# Patient Record
Sex: MALE | Race: WHITE | NOT HISPANIC OR LATINO | Employment: FULL TIME | URBAN - METROPOLITAN AREA
[De-identification: names, ages, dates, MRNs, and addresses within clinical notes are randomized per-mention and may not be internally consistent; named-entity substitution may affect disease eponyms.]

---

## 2019-12-09 ENCOUNTER — CLINICAL SUPPORT (OUTPATIENT)
Dept: INTERNAL MEDICINE | Facility: CLINIC | Age: 39
End: 2019-12-09

## 2019-12-09 ENCOUNTER — OFFICE VISIT (OUTPATIENT)
Dept: INTERNAL MEDICINE | Facility: CLINIC | Age: 39
End: 2019-12-09

## 2019-12-09 VITALS
SYSTOLIC BLOOD PRESSURE: 138 MMHG | DIASTOLIC BLOOD PRESSURE: 88 MMHG | OXYGEN SATURATION: 96 % | HEART RATE: 72 BPM | WEIGHT: 180.56 LBS

## 2019-12-09 DIAGNOSIS — R79.89 ELEVATED LFTS: ICD-10-CM

## 2019-12-09 DIAGNOSIS — Z00.00 ROUTINE PHYSICAL EXAMINATION: Primary | ICD-10-CM

## 2019-12-09 DIAGNOSIS — E78.49 OTHER HYPERLIPIDEMIA: ICD-10-CM

## 2019-12-09 DIAGNOSIS — Z00.00 ROUTINE GENERAL MEDICAL EXAMINATION AT A HEALTH CARE FACILITY: Primary | ICD-10-CM

## 2019-12-09 DIAGNOSIS — I10 HYPERTENSION, UNSPECIFIED TYPE: ICD-10-CM

## 2019-12-09 LAB
ALBUMIN SERPL BCP-MCNC: 3.9 G/DL (ref 3.5–5.2)
ALP SERPL-CCNC: 95 U/L (ref 55–135)
ALT SERPL W/O P-5'-P-CCNC: 123 U/L (ref 10–44)
ANION GAP SERPL CALC-SCNC: 12 MMOL/L (ref 8–16)
AST SERPL-CCNC: 182 U/L (ref 10–40)
BILIRUB SERPL-MCNC: 1.8 MG/DL (ref 0.1–1)
BUN SERPL-MCNC: 8 MG/DL (ref 6–20)
CALCIUM SERPL-MCNC: 9.8 MG/DL (ref 8.7–10.5)
CHLORIDE SERPL-SCNC: 103 MMOL/L (ref 95–110)
CHOLEST SERPL-MCNC: 239 MG/DL (ref 120–199)
CHOLEST/HDLC SERPL: 6 {RATIO} (ref 2–5)
CO2 SERPL-SCNC: 28 MMOL/L (ref 23–29)
CREAT SERPL-MCNC: 0.8 MG/DL (ref 0.5–1.4)
ERYTHROCYTE [DISTWIDTH] IN BLOOD BY AUTOMATED COUNT: 12.1 % (ref 11.5–14.5)
EST. GFR  (AFRICAN AMERICAN): >60 ML/MIN/1.73 M^2
EST. GFR  (NON AFRICAN AMERICAN): >60 ML/MIN/1.73 M^2
ESTIMATED AVG GLUCOSE: 97 MG/DL (ref 68–131)
GLUCOSE SERPL-MCNC: 104 MG/DL (ref 70–110)
HBA1C MFR BLD HPLC: 5 % (ref 4–5.6)
HCT VFR BLD AUTO: 44.4 % (ref 40–54)
HDLC SERPL-MCNC: 40 MG/DL (ref 40–75)
HDLC SERPL: 16.7 % (ref 20–50)
HGB BLD-MCNC: 14.7 G/DL (ref 14–18)
LDLC SERPL CALC-MCNC: 169.8 MG/DL (ref 63–159)
MCH RBC QN AUTO: 36 PG (ref 27–31)
MCHC RBC AUTO-ENTMCNC: 33.1 G/DL (ref 32–36)
MCV RBC AUTO: 109 FL (ref 82–98)
NONHDLC SERPL-MCNC: 199 MG/DL
PLATELET # BLD AUTO: 147 K/UL (ref 150–350)
PMV BLD AUTO: 9.6 FL (ref 9.2–12.9)
POTASSIUM SERPL-SCNC: 3.9 MMOL/L (ref 3.5–5.1)
PROT SERPL-MCNC: 8.7 G/DL (ref 6–8.4)
RBC # BLD AUTO: 4.08 M/UL (ref 4.6–6.2)
SODIUM SERPL-SCNC: 143 MMOL/L (ref 136–145)
TRIGL SERPL-MCNC: 146 MG/DL (ref 30–150)
TSH SERPL DL<=0.005 MIU/L-ACNC: 0.8 UIU/ML (ref 0.4–4)
WBC # BLD AUTO: 6.42 K/UL (ref 3.9–12.7)

## 2019-12-09 PROCEDURE — 36415 COLL VENOUS BLD VENIPUNCTURE: CPT

## 2019-12-09 PROCEDURE — 99999 PR PBB SHADOW E&M-NEW PATIENT-LVL III: ICD-10-PCS | Mod: PBBFAC,,, | Performed by: INTERNAL MEDICINE

## 2019-12-09 PROCEDURE — 99386 PREV VISIT NEW AGE 40-64: CPT | Mod: S$PBB,,, | Performed by: INTERNAL MEDICINE

## 2019-12-09 PROCEDURE — 85027 COMPLETE CBC AUTOMATED: CPT

## 2019-12-09 PROCEDURE — 99999 PR PBB SHADOW E&M-NEW PATIENT-LVL III: CPT | Mod: PBBFAC,,, | Performed by: INTERNAL MEDICINE

## 2019-12-09 PROCEDURE — 80053 COMPREHEN METABOLIC PANEL: CPT

## 2019-12-09 PROCEDURE — 99386 PR PREVENTIVE VISIT,NEW,40-64: ICD-10-PCS | Mod: S$PBB,,, | Performed by: INTERNAL MEDICINE

## 2019-12-09 PROCEDURE — 84443 ASSAY THYROID STIM HORMONE: CPT

## 2019-12-09 PROCEDURE — 80061 LIPID PANEL: CPT

## 2019-12-09 PROCEDURE — 99203 OFFICE O/P NEW LOW 30 MIN: CPT | Mod: PBBFAC | Performed by: INTERNAL MEDICINE

## 2019-12-09 PROCEDURE — 83036 HEMOGLOBIN GLYCOSYLATED A1C: CPT

## 2019-12-09 RX ORDER — NEBIVOLOL 10 MG/1
10 TABLET ORAL DAILY
COMMUNITY
End: 2019-12-09 | Stop reason: SDUPTHER

## 2019-12-09 RX ORDER — NEBIVOLOL 10 MG/1
10 TABLET ORAL DAILY
Qty: 30 TABLET | Refills: 12 | Status: SHIPPED | OUTPATIENT
Start: 2019-12-09 | End: 2020-03-20 | Stop reason: SDUPTHER

## 2019-12-09 NOTE — LETTER
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  This letter will serve a brief summary of the history, physical findings, and laboratory/studies performed and recommendations at that time.    Reason for Visit: Executive Health Preventive Physical Examination    Subjective:       Patient ID: Сергей Ragsdale is a 39 y.o. male.    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  Healthy young male coming from [place].  He says he has generally been healthy but does have a history of hypertension and elevated LFTs.  He says this has been watched in the past and he has had ultrasound and CT scan.  Blood pressure has been fairly well controlled on Bystolic.  He would like a new prescription.  He does have a skin mole on the torso that was seen by dermatologist smoked 2 years ago and he would like to continue to follow.  Once he is on the staff and insurance has kicked in he will consider establishing with some specialists    Review of Systems   Constitutional: Negative for chills, fatigue, fever and unexpected weight change.   HENT: Negative for nosebleeds and trouble swallowing.    Eyes: Negative for pain and visual disturbance.   Respiratory: Negative for cough, shortness of breath and wheezing.    Cardiovascular: Negative for chest pain and palpitations.   Gastrointestinal: Negative for abdominal pain, constipation, diarrhea, nausea and vomiting.   Genitourinary: Negative for difficulty urinating and hematuria.   Musculoskeletal: Negative for neck pain.   Skin: Negative for rash.        Stable skin mole just above umbilicus   Neurological: Negative for  dizziness and headaches.   Hematological: Does not bruise/bleed easily.   Psychiatric/Behavioral: Negative for dysphoric mood, sleep disturbance and suicidal ideas.       Objective:      Physical Exam   Constitutional: He is oriented to person, place, and time. He appears well-developed and well-nourished. No distress.   HENT:   Head: Normocephalic and atraumatic.   Right Ear: External ear normal.   Left Ear: External ear normal.   Mouth/Throat: Oropharynx is clear and moist. No oropharyngeal exudate.   TM's clear, pharynx clear   Eyes: Pupils are equal, round, and reactive to light. Conjunctivae and EOM are normal. No scleral icterus.   Neck: Normal range of motion. Neck supple. No thyromegaly present.   No supraclavicular nodes palpated   Cardiovascular: Normal rate, regular rhythm and normal heart sounds.   No murmur heard.  Pulmonary/Chest: Effort normal and breath sounds normal. He has no wheezes.   Abdominal: Soft. Bowel sounds are normal. He exhibits no mass. There is no tenderness.   No organomegaly   Musculoskeletal: He exhibits no edema.   Lymphadenopathy:     He has no cervical adenopathy.   Neurological: He is alert and oriented to person, place, and time.   Skin: No rash noted. No erythema. No pallor.   Brown homogeneous round skin mole above the umbilicus about the size of a pencil eraser.  Patient thinks little change in the last several years.   Psychiatric: He has a normal mood and affect. His behavior is normal.       Assessment:       1. Routine physical examination    2. Elevated LFTs    3. Hypertension, unspecified type    4. Other hyperlipidemia        Plan:       Сергей was seen today for [facility].    Diagnoses and all orders for this visit:    Routine physical examination    Elevated LFTs    Hypertension, unspecified type    Other hyperlipidemia    Other orders  -     nebivolol (BYSTOLIC) 10 MG Tab; Take 1 tablet (10 mg total) by mouth once daily.        Personal and family History as  well as problem list reviewed.   Continue current medication.  Follow-up in a few months if patient would like to reassess liver, blood pressure skin.     Labs:  Results for orders placed or performed in visit on 12/09/19   Comprehensive metabolic panel   Result Value Ref Range    Sodium 143 136 - 145 mmol/L    Potassium 3.9 3.5 - 5.1 mmol/L    Chloride 103 95 - 110 mmol/L    CO2 28 23 - 29 mmol/L    Glucose 104 70 - 110 mg/dL    BUN, Bld 8 6 - 20 mg/dL    Creatinine 0.8 0.5 - 1.4 mg/dL    Calcium 9.8 8.7 - 10.5 mg/dL    Total Protein 8.7 (H) 6.0 - 8.4 g/dL    Albumin 3.9 3.5 - 5.2 g/dL    Total Bilirubin 1.8 (H) 0.1 - 1.0 mg/dL    Alkaline Phosphatase 95 55 - 135 U/L     (H) 10 - 40 U/L     (H) 10 - 44 U/L    Anion Gap 12 8 - 16 mmol/L    eGFR if African American >60.0 >60 mL/min/1.73 m^2    eGFR if non African American >60.0 >60 mL/min/1.73 m^2   CBC Without Differential   Result Value Ref Range    WBC 6.42 3.90 - 12.70 K/uL    RBC 4.08 (L) 4.60 - 6.20 M/uL    Hemoglobin 14.7 14.0 - 18.0 g/dL    Hematocrit 44.4 40.0 - 54.0 %    Mean Corpuscular Volume 109 (H) 82 - 98 fL    Mean Corpuscular Hemoglobin 36.0 (H) 27.0 - 31.0 pg    Mean Corpuscular Hemoglobin Conc 33.1 32.0 - 36.0 g/dL    RDW 12.1 11.5 - 14.5 %    Platelets 147 (L) 150 - 350 K/uL    MPV 9.6 9.2 - 12.9 fL   Lipid panel   Result Value Ref Range    Cholesterol 239 (H) 120 - 199 mg/dL    Triglycerides 146 30 - 150 mg/dL    HDL 40 40 - 75 mg/dL    LDL Cholesterol 169.8 (H) 63.0 - 159.0 mg/dL    Hdl/Cholesterol Ratio 16.7 (L) 20.0 - 50.0 %    Total Cholesterol/HDL Ratio 6.0 (H) 2.0 - 5.0    Non-HDL Cholesterol 199 mg/dL   TSH   Result Value Ref Range    TSH 0.795 0.400 - 4.000 uIU/mL   Hemoglobin A1c   Result Value Ref Range    Hemoglobin A1C 5.0 4.0 - 5.6 %    Estimated Avg Glucose 97 68 - 131 mg/dL        Assessment/Recommendations:  Routine Health Maintenance    At this time, you appear to be in good medical condition.  I look forward to  seeing you again next year.  Please contact me should you have any questions or concerns regarding physical findings, or my recommendations.              If you have any questions or concerns, please don't hesitate to call.    Sincerely,        Roney Love MD

## 2019-12-09 NOTE — PROGRESS NOTES
Subjective:       Patient ID: Сергей Ragsdale is a 39 y.o. male.    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  Healthy young male coming from [place].  He says he has generally been healthy but does have a history of hypertension and elevated LFTs.  He says this has been watched in the past and he has had ultrasound and CT scan.  Blood pressure has been fairly well controlled on Bystolic.  He would like a new prescription.  He does have a skin mole on the torso that was seen by dermatologist smoked 2 years ago and he would like to continue to follow.  Once he is on the staff and insurance has kicked in he will consider establishing with some specialists    Review of Systems   Constitutional: Negative for chills, fatigue, fever and unexpected weight change.   HENT: Negative for nosebleeds and trouble swallowing.    Eyes: Negative for pain and visual disturbance.   Respiratory: Negative for cough, shortness of breath and wheezing.    Cardiovascular: Negative for chest pain and palpitations.   Gastrointestinal: Negative for abdominal pain, constipation, diarrhea, nausea and vomiting.   Genitourinary: Negative for difficulty urinating and hematuria.   Musculoskeletal: Negative for neck pain.   Skin: Negative for rash.        Stable skin mole just above umbilicus   Neurological: Negative for dizziness and headaches.   Hematological: Does not bruise/bleed easily.   Psychiatric/Behavioral: Negative for dysphoric mood, sleep disturbance and suicidal ideas.       Objective:      Physical Exam   Constitutional: He is oriented to person, place, and time. He appears well-developed and well-nourished. No distress.   HENT:   Head: Normocephalic and atraumatic.   Right Ear: External ear normal.   Left Ear: External ear normal.   Mouth/Throat: Oropharynx is clear and moist. No oropharyngeal exudate.   TM's clear, pharynx clear   Eyes:  Pupils are equal, round, and reactive to light. Conjunctivae and EOM are normal. No scleral icterus.   Neck: Normal range of motion. Neck supple. No thyromegaly present.   No supraclavicular nodes palpated   Cardiovascular: Normal rate, regular rhythm and normal heart sounds.   No murmur heard.  Pulmonary/Chest: Effort normal and breath sounds normal. He has no wheezes.   Abdominal: Soft. Bowel sounds are normal. He exhibits no mass. There is no tenderness.   No organomegaly   Musculoskeletal: He exhibits no edema.   Lymphadenopathy:     He has no cervical adenopathy.   Neurological: He is alert and oriented to person, place, and time.   Skin: No rash noted. No erythema. No pallor.   Brown homogeneous round skin mole above the umbilicus about the size of a pencil eraser.  Patient thinks little change in the last several years.   Psychiatric: He has a normal mood and affect. His behavior is normal.       Assessment:       1. Routine physical examination    2. Elevated LFTs    3. Hypertension, unspecified type    4. Other hyperlipidemia        Plan:       Сергей was seen today for [facility].    Diagnoses and all orders for this visit:    Routine physical examination    Elevated LFTs    Hypertension, unspecified type    Other hyperlipidemia    Other orders  -     nebivolol (BYSTOLIC) 10 MG Tab; Take 1 tablet (10 mg total) by mouth once daily.        Personal and family History as well as problem list reviewed.   Continue current medication.  Follow-up in a few months if patient would like to reassess liver, blood pressure skin.

## 2020-03-20 ENCOUNTER — PATIENT MESSAGE (OUTPATIENT)
Dept: INTERNAL MEDICINE | Facility: CLINIC | Age: 40
End: 2020-03-20

## 2020-03-20 RX ORDER — NEBIVOLOL 10 MG/1
10 TABLET ORAL DAILY
Qty: 90 TABLET | Refills: 12 | Status: SHIPPED | OUTPATIENT
Start: 2020-03-20

## 2020-03-20 RX ORDER — NEBIVOLOL 10 MG/1
10 TABLET ORAL DAILY
Qty: 30 TABLET | Refills: 12 | Status: SHIPPED | OUTPATIENT
Start: 2020-03-20 | End: 2020-03-20 | Stop reason: SDUPTHER

## 2020-04-21 DIAGNOSIS — Z01.84 ANTIBODY RESPONSE EXAMINATION: ICD-10-CM

## 2020-05-21 DIAGNOSIS — Z01.84 ANTIBODY RESPONSE EXAMINATION: ICD-10-CM

## 2020-06-20 DIAGNOSIS — Z01.84 ANTIBODY RESPONSE EXAMINATION: ICD-10-CM

## 2020-07-20 DIAGNOSIS — Z01.84 ANTIBODY RESPONSE EXAMINATION: ICD-10-CM

## 2020-07-31 ENCOUNTER — PATIENT OUTREACH (OUTPATIENT)
Dept: ADMINISTRATIVE | Facility: OTHER | Age: 40
End: 2020-07-31

## 2020-07-31 ENCOUNTER — OFFICE VISIT (OUTPATIENT)
Dept: PODIATRY | Facility: CLINIC | Age: 40
End: 2020-07-31
Payer: COMMERCIAL

## 2020-07-31 ENCOUNTER — TELEPHONE (OUTPATIENT)
Dept: PODIATRY | Facility: CLINIC | Age: 40
End: 2020-07-31

## 2020-07-31 VITALS — SYSTOLIC BLOOD PRESSURE: 132 MMHG | WEIGHT: 191.81 LBS | DIASTOLIC BLOOD PRESSURE: 84 MMHG | HEART RATE: 72 BPM

## 2020-07-31 DIAGNOSIS — M19.079 ARTHRITIS OF FOOT: ICD-10-CM

## 2020-07-31 DIAGNOSIS — M79.671 FOOT PAIN, BILATERAL: Primary | ICD-10-CM

## 2020-07-31 DIAGNOSIS — M79.672 FOOT PAIN, BILATERAL: Primary | ICD-10-CM

## 2020-07-31 DIAGNOSIS — M24.573 EQUINUS CONTRACTURE OF ANKLE: ICD-10-CM

## 2020-07-31 PROCEDURE — 3079F PR MOST RECENT DIASTOLIC BLOOD PRESSURE 80-89 MM HG: ICD-10-PCS | Mod: CPTII,S$GLB,, | Performed by: PODIATRIST

## 2020-07-31 PROCEDURE — 99203 OFFICE O/P NEW LOW 30 MIN: CPT | Mod: 25,S$GLB,, | Performed by: PODIATRIST

## 2020-07-31 PROCEDURE — 3079F DIAST BP 80-89 MM HG: CPT | Mod: CPTII,S$GLB,, | Performed by: PODIATRIST

## 2020-07-31 PROCEDURE — 3075F PR MOST RECENT SYSTOLIC BLOOD PRESS GE 130-139MM HG: ICD-10-PCS | Mod: CPTII,S$GLB,, | Performed by: PODIATRIST

## 2020-07-31 PROCEDURE — 3075F SYST BP GE 130 - 139MM HG: CPT | Mod: CPTII,S$GLB,, | Performed by: PODIATRIST

## 2020-07-31 PROCEDURE — 99203 PR OFFICE/OUTPT VISIT, NEW, LEVL III, 30-44 MIN: ICD-10-PCS | Mod: 25,S$GLB,, | Performed by: PODIATRIST

## 2020-07-31 PROCEDURE — 29540 PR STRAPPING; ANKLE &/OR FOOT: ICD-10-PCS | Mod: 50,S$GLB,, | Performed by: PODIATRIST

## 2020-07-31 PROCEDURE — 99999 PR PBB SHADOW E&M-EST. PATIENT-LVL III: CPT | Mod: PBBFAC,,, | Performed by: PODIATRIST

## 2020-07-31 PROCEDURE — 99999 PR PBB SHADOW E&M-EST. PATIENT-LVL III: ICD-10-PCS | Mod: PBBFAC,,, | Performed by: PODIATRIST

## 2020-07-31 PROCEDURE — 29540 STRAPPING ANKLE &/FOOT: CPT | Mod: 50,S$GLB,, | Performed by: PODIATRIST

## 2020-07-31 RX ORDER — MELOXICAM 15 MG/1
15 TABLET ORAL DAILY
Qty: 30 TABLET | Refills: 0 | Status: ON HOLD | OUTPATIENT
Start: 2020-07-31 | End: 2020-09-16

## 2020-07-31 NOTE — PROGRESS NOTES
Subjective:      Patient ID: Сергей Ragsdale is a 40 y.o. male.    Chief Complaint: Foot Pain (8/10 bilateral foot pain )    Aching throbbing intense pain both feet/arches deep inside the foot.  Gradual onset, worsening over past several months, aggravated by increased weight bearing, shoe gear, pressure.  Otc inserts, shoe changes helped minimally so far..  OTC pain med not helping.  Denies trauma, surgery.    Review of Systems   Constitution: Negative for chills, diaphoresis, fever, malaise/fatigue and night sweats.   Cardiovascular: Negative for claudication, cyanosis, leg swelling and syncope.   Skin: Negative for color change, dry skin, nail changes, rash, suspicious lesions and unusual hair distribution.   Musculoskeletal: Positive for joint pain. Negative for falls, joint swelling, muscle cramps, muscle weakness and stiffness.   Gastrointestinal: Negative for constipation, diarrhea, nausea and vomiting.   Neurological: Negative for brief paralysis, disturbances in coordination, focal weakness, numbness, paresthesias, sensory change and tremors.           Objective:      Physical Exam  Constitutional:       General: He is not in acute distress.     Appearance: He is well-developed. He is not diaphoretic.   Cardiovascular:      Pulses:           Popliteal pulses are 2+ on the right side and 2+ on the left side.        Dorsalis pedis pulses are 2+ on the right side and 2+ on the left side.        Posterior tibial pulses are 2+ on the right side and 2+ on the left side.      Comments: Capillary refill 3 seconds all toes/distal feet, all toes/both feet warm to touch.      Negative lymphadenopathy bilateral popliteal fossa and tarsal tunnel.      Negavie lower extremity edema bilateral.    Musculoskeletal:      Right ankle: He exhibits normal range of motion, no swelling, no ecchymosis, no deformity, no laceration and normal pulse. Achilles tendon normal. Achilles tendon exhibits no pain, no defect and  normal Arroyo's test results.      Comments: Deep generalized pain in medial column both feet mtpj to tnj without deformity, loss of function, or signs of acute trauma.    Ankle dorsiflexion decreased at <10 degrees bilateral with moderate increase with knee flexion bilateral.      Otherwise, Normal angle, base, station of gait. All ten toes without clubbing, cyanosis, or signs of ischemia.  No pain to palpation bilateral lower extremities.  Range of motion, stability, muscle strength, and muscle tone normal bilateral feet and legs.    Lymphadenopathy:      Lower Body: No right inguinal adenopathy. No left inguinal adenopathy.      Comments: Negative lymphadenopathy bilateral popliteal fossa and tarsal tunnel.    Negative lymphangitic streaking bilateral feet/ankles/legs.   Skin:     General: Skin is warm and dry.      Capillary Refill: Capillary refill takes 2 to 3 seconds.      Coloration: Skin is not pale.      Findings: No abrasion, bruising, burn, ecchymosis, erythema, laceration, lesion or rash.      Nails: There is no clubbing.        Comments: Dry scale with superficial flakes over an erythematous base plantar feet bilateral without ulceration, drainage, pus, tracking, fluctuance, malodor, or cardinal signs infection.    Otherwise, Skin is normal age and health appropriate color, turgor, texture, and temperature bilateral lower extremities without ulceration, hyperpigmentation, discoloration, masses nodules or cords palpated.  No ecchymosis, erythema, edema, or cardinal signs of infection bilateral lower extremities.    Neurological:      Mental Status: He is alert and oriented to person, place, and time.      Sensory: No sensory deficit.      Motor: No tremor, atrophy or abnormal muscle tone.      Gait: Gait normal.      Deep Tendon Reflexes:      Reflex Scores:       Patellar reflexes are 2+ on the right side and 2+ on the left side.       Achilles reflexes are 2+ on the right side and 2+ on the left  side.     Comments: Negative tinel sign to percussion sural, superficial peroneal, deep peroneal, saphenous, and posterior tibial nerves right and left ankles and feet.     Psychiatric:         Behavior: Behavior is cooperative.               Assessment:       Encounter Diagnoses   Name Primary?    Foot pain, bilateral Yes    Equinus contracture of ankle     Arthritis of foot          Plan:       Сергей was seen today for foot pain.    Diagnoses and all orders for this visit:    Foot pain, bilateral    Equinus contracture of ankle    Arthritis of foot    Other orders  -     meloxicam (MOBIC) 15 MG tablet; Take 1 tablet (15 mg total) by mouth once daily.      I counseled the patient on his conditions, their implications and medical management.        Patient will stretch the tendo achilles complex three times daily as demonstrated in the office.  Literature was dispensed illustrating proper stretching technique.    I applied a plantar rest strapping to the patient's right and left foot to offload symptomatic area, support the arch, and relieve pain.    Patient will obtain over the counter arch supports and wear them in shoes whenever possible.  Athletic shoes intended for walking or running are usually best.    The patient was advised that NSAID-type medications have two very important potential side effects: gastrointestinal irritation including hemorrhage and renal injuries. He was asked to take the medication with food and to stop if he experiences any GI upset. I asked him to call for vomiting, abdominal pain or black/bloody stools. The patient expresses understanding of these issues and questions were answered.    Discussed conservative treatment with shoes of adequate dimensions, material, and style to alleviate symptoms and delay or prevent surgical intervention.    meloxicam          Follow up in about 1 month (around 8/31/2020).

## 2020-07-31 NOTE — PROGRESS NOTES
Chart reviewed.   Immunizations: Triggered Imm Registry     Orders placed: n/a  Upcoming appts to satisfy TOAN topics: n/a

## 2020-07-31 NOTE — TELEPHONE ENCOUNTER
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

## 2020-08-07 ENCOUNTER — HOSPITAL ENCOUNTER (OUTPATIENT)
Dept: RADIOLOGY | Facility: HOSPITAL | Age: 40
Discharge: HOME | End: 2020-08-07
Attending: ORTHOPAEDIC SURGERY
Payer: COMMERCIAL

## 2020-08-07 ENCOUNTER — OFFICE VISIT (OUTPATIENT)
Dept: ORTHOPEDICS | Facility: CLINIC | Age: 40
End: 2020-08-07
Payer: COMMERCIAL

## 2020-08-07 DIAGNOSIS — M79.2 NEUROGENIC PAIN, LEG, LEFT: Primary | ICD-10-CM

## 2020-08-07 DIAGNOSIS — R52 PAIN: Primary | ICD-10-CM

## 2020-08-07 DIAGNOSIS — M25.572 JOINT PAIN OF ANKLE AND FOOT, LEFT: ICD-10-CM

## 2020-08-07 DIAGNOSIS — M25.572 PAIN OF JOINT OF LEFT ANKLE AND FOOT: ICD-10-CM

## 2020-08-07 DIAGNOSIS — R52 PAIN: ICD-10-CM

## 2020-08-07 PROCEDURE — 73630 X-RAY EXAM OF FOOT: CPT | Mod: TC,LT

## 2020-08-07 PROCEDURE — 99203 PR OFFICE/OUTPT VISIT, NEW, LEVL III, 30-44 MIN: ICD-10-PCS | Mod: S$GLB,,, | Performed by: ORTHOPAEDIC SURGERY

## 2020-08-07 PROCEDURE — 73630 X-RAY EXAM OF FOOT: CPT | Mod: 26,LT,, | Performed by: RADIOLOGY

## 2020-08-07 PROCEDURE — 73630 XR FOOT COMPLETE 3 VIEW LEFT: ICD-10-PCS | Mod: 26,LT,, | Performed by: RADIOLOGY

## 2020-08-07 PROCEDURE — 99203 OFFICE O/P NEW LOW 30 MIN: CPT | Mod: S$GLB,,, | Performed by: ORTHOPAEDIC SURGERY

## 2020-08-07 PROCEDURE — 99999 PR PBB SHADOW E&M-EST. PATIENT-LVL II: CPT | Mod: PBBFAC,,, | Performed by: ORTHOPAEDIC SURGERY

## 2020-08-07 PROCEDURE — 99999 PR PBB SHADOW E&M-EST. PATIENT-LVL II: ICD-10-PCS | Mod: PBBFAC,,, | Performed by: ORTHOPAEDIC SURGERY

## 2020-08-07 RX ORDER — METHYLPREDNISOLONE 4 MG/1
TABLET ORAL
Qty: 21 TABLET | Refills: 0 | Status: ON HOLD | OUTPATIENT
Start: 2020-08-07 | End: 2020-09-16

## 2020-08-07 RX ORDER — TRAMADOL HYDROCHLORIDE 50 MG/1
50 TABLET ORAL EVERY 8 HOURS PRN
Qty: 60 TABLET | Refills: 0 | Status: SHIPPED | OUTPATIENT
Start: 2020-08-07 | End: 2020-08-27

## 2020-08-07 RX ORDER — GABAPENTIN 300 MG/1
600 CAPSULE ORAL NIGHTLY
Qty: 60 CAPSULE | Refills: 11 | Status: ON HOLD | OUTPATIENT
Start: 2020-08-07 | End: 2020-09-18 | Stop reason: SDUPTHER

## 2020-08-07 NOTE — PROGRESS NOTES
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 Reports pain is constant and excruciating. He denies any alleviating or exacerbating symptoms. Pain is so severe that it wakes him from sleep consistently. He reports being woken from sleep every 30 minutes. He does possibly find some relief from walking around but states this does not provide much relief. Pain is so severe that he reports being open to cut the foot off if that is what it took. The pain began about 4 months ago and has been progressively worsening to get to the point where it is now. He denies any known injury to the foot or any increased activity during this interval. Initially, the pain was very mild and limited to the medial aspect of his foot along his arch. Today the pain has spread to involve the medial and lateral aspects of his plantar foot and now extends to his posterior leg to the knee. He describes this pain as "very sharp" and "stabbing". He reports that the pain is not burning or tingling. He reports mild knee pain as well which he attributes to possible changes in his gate secondary to his foot pain. As pain worsened he has ultimately seen two separate podiatrist who have treated him for plantar fasciitis with stretching, immobilization, and steroid injections. He reports pain has only gotten worse over this interval. He has been taking Mobic as prescribed by podiatrist. Patient also reports taking 800mg of ibuprofen every 4 hours to get through clinic the other day even though with his medical background he knows this is not prudent. He states the mobic and ibuprofen are completely ineffective. He has not had formal PT at this point.        Review of patient's allergies indicates:  No Known Allergies    Past Medical History:   Diagnosis Date    Elevated LFTs     HTN (hypertension)     " Hyperlipidemia      Past Surgical History:   Procedure Laterality Date    TONSILLECTOMY       Family History   Problem Relation Age of Onset    Hypertension Mother     Hypertension Father     No Known Problems Sister     Congenital heart disease Brother     Coronary artery disease Maternal Grandfather     Cancer Paternal Grandmother         breast    Coronary artery disease Paternal Grandfather     No Known Problems Brother      Social History     Tobacco Use    Smoking status: Never Smoker    Smokeless tobacco: Never Used   Substance Use Topics    Alcohol use: Yes     Comment: socially    Drug use: Never        Review of Systems:  Patient denies constitutional symptoms, cardiac symptoms, respiratory symptoms, GI symptoms.  The remainder of the musculoskeletal ROS is included in the HPI.      OBJECTIVE:     Physical Exam:  Gen:  No acute distress  CV:  Peripherally well-perfused.  Pulses 2+ bilaterally.  Lungs:  Normal respiratory effort.  Abdomen:  Soft, non-tender, non-distended  Head/Neck:  Normocephalic.  Atraumatic. No TTP, AROM and PROM intact without pain  Neuro:  CN intact without deficit, SILT throughout B/L Upper & Lower Extremities    MSK:    Standing examination shows neutral foot alignment.     LLE  Skin intact  No edema/erythema/signs of infection  TTP diffusely in medial and lateral plantar forefoot. No significant TTP over plantar fascia origin.   Nontender of Achilles and insertion.  Calc squeeze is negative.  Compartments soft  Full painless ROM of ankle and foot  No gastroc or achilles contracture appreciated   SILT Sa/Shoemaker/DP/SP/T  Motor intact EHL/FHL/TA/Gastroc with 5/5 strength  2+ DP, 2+ PT        Diagnostic Results:  Left foot X-rays were ordered and images reviewed by me.  These showed no acute osseous abnormalities. No fractures. No dislocation. No evidence of degenerative changes w/i joints of the foot.    ASSESSMENT/PLAN:     1. Neurogenic pain, leg, left  EMG W/ ULTRASOUND  AND NERVE CONDUCTION TEST 1 Extremity    traMADoL (ULTRAM) 50 mg tablet    methylPREDNISolone (MEDROL, MEKHI,) 4 mg tablet    gabapentin (NEURONTIN) 300 MG capsule   2. Joint pain of ankle and foot, left  MRI Ankle Without Contrast Left    traMADoL (ULTRAM) 50 mg tablet    methylPREDNISolone (MEDROL, MEKHI,) 4 mg tablet   3. Pain of joint of left ankle and foot         A/P: Сергей Ragsdale is a 40 y.o. M with severe left foot pain of unknown origin. No history of injury and physical exam unable to elicit any findings that would point towards a specific diagnosis of musculoskeletal injury. His constant pain, night pain, and overall atypical nature of his pain are concerning for a possible neurological origin of pain. In any account, the severity of his pain is very concerning.    Plan:  - Will order MRI of the left ankle to try to evaluate further structural abnormality which may explain his severe pain. Will order EMG and NCV studies to further evaluate possible neuropathic or neuromuscular component of pain. Will begin treatment with neurontin to be taken at night. Given his constant and excruciating pain will order a short course of Tramadol and steroid dose pack as well to see if we can get symptoms to settle down so he can get enough relief to sleep and function. Will have patient f/u after further diagnostic studies.    I have personally taken the history and examined this patient and agree with the residents note as stated above.     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  The fact that his symptoms occur at rest as well as with activity suggests a more central neurogenic type problem.  I did not appreciate any obvious structural problems on examination or plain x-rays.  I will await the results of the MRI and the EMG nerve conduction studies before making any further recommendations.  I did prescribe gabapentin as well as  tramadol to see if he can get some relief.    Follow-up with results of MRI an EMG nerve conduction study

## 2020-08-11 ENCOUNTER — TELEPHONE (OUTPATIENT)
Dept: ORTHOPEDICS | Facility: CLINIC | Age: 40
End: 2020-08-11

## 2020-08-11 NOTE — TELEPHONE ENCOUNTER
Left message informing patient his EMG is scheduled for 8/17/20 @ 9am. Also asked him to contact me to schedule his f/u appointment with .

## 2020-08-12 ENCOUNTER — TELEPHONE (OUTPATIENT)
Dept: ORTHOPEDICS | Facility: CLINIC | Age: 40
End: 2020-08-12

## 2020-08-13 ENCOUNTER — HOSPITAL ENCOUNTER (OUTPATIENT)
Dept: RADIOLOGY | Facility: HOSPITAL | Age: 40
Discharge: HOME | End: 2020-08-13
Attending: ORTHOPAEDIC SURGERY
Payer: COMMERCIAL

## 2020-08-13 DIAGNOSIS — M25.572 JOINT PAIN OF ANKLE AND FOOT, LEFT: ICD-10-CM

## 2020-08-13 PROCEDURE — 73721 MRI JNT OF LWR EXTRE W/O DYE: CPT | Mod: TC,LT

## 2020-08-13 PROCEDURE — 73721 MRI JNT OF LWR EXTRE W/O DYE: CPT | Mod: 26,LT,, | Performed by: RADIOLOGY

## 2020-08-13 PROCEDURE — 73721 MRI ANKLE WITHOUT CONTRAST LEFT: ICD-10-PCS | Mod: 26,LT,, | Performed by: RADIOLOGY

## 2020-08-17 ENCOUNTER — PROCEDURE VISIT (OUTPATIENT)
Dept: NEUROLOGY | Facility: CLINIC | Age: 40
End: 2020-08-17
Payer: COMMERCIAL

## 2020-08-17 DIAGNOSIS — M79.2 NEUROGENIC PAIN, LEG, LEFT: ICD-10-CM

## 2020-08-17 PROCEDURE — 95911 PR NERVE CONDUCTION STUDY; 9-10 STUDIES: ICD-10-PCS | Mod: S$GLB,,, | Performed by: PSYCHIATRY & NEUROLOGY

## 2020-08-17 PROCEDURE — 95911 NRV CNDJ TEST 9-10 STUDIES: CPT | Mod: S$GLB,,, | Performed by: PSYCHIATRY & NEUROLOGY

## 2020-08-17 PROCEDURE — 95886 MUSC TEST DONE W/N TEST COMP: CPT | Mod: S$GLB,,, | Performed by: PSYCHIATRY & NEUROLOGY

## 2020-08-17 PROCEDURE — 95886 PR EMG COMPLETE, W/ NERVE CONDUCTION STUDIES, 5+ MUSCLES: ICD-10-PCS | Mod: S$GLB,,, | Performed by: PSYCHIATRY & NEUROLOGY

## 2020-08-17 NOTE — PROCEDURES
Procedures       Please see NCS/EMG procedure report    Berlin Polanco MD  [facility]

## 2020-08-19 DIAGNOSIS — Z01.84 ANTIBODY RESPONSE EXAMINATION: ICD-10-CM

## 2020-08-24 ENCOUNTER — OFFICE VISIT (OUTPATIENT)
Dept: ORTHOPEDICS | Facility: CLINIC | Age: 40
End: 2020-08-24
Payer: COMMERCIAL

## 2020-08-24 DIAGNOSIS — M79.2 NEUROGENIC PAIN, LEG, LEFT: Primary | ICD-10-CM

## 2020-08-24 PROCEDURE — 99213 PR OFFICE/OUTPT VISIT, EST, LEVL III, 20-29 MIN: ICD-10-PCS | Mod: S$GLB,,, | Performed by: ORTHOPAEDIC SURGERY

## 2020-08-24 PROCEDURE — 99999 PR PBB SHADOW E&M-EST. PATIENT-LVL II: CPT | Mod: PBBFAC,,, | Performed by: ORTHOPAEDIC SURGERY

## 2020-08-24 PROCEDURE — 99999 PR PBB SHADOW E&M-EST. PATIENT-LVL II: ICD-10-PCS | Mod: PBBFAC,,, | Performed by: ORTHOPAEDIC SURGERY

## 2020-08-24 PROCEDURE — 99213 OFFICE O/P EST LOW 20 MIN: CPT | Mod: S$GLB,,, | Performed by: ORTHOPAEDIC SURGERY

## 2020-08-24 NOTE — PROGRESS NOTES
Сергей Ragsdale returns today for the results of his MRI and EMG nerve conduction studies.  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  He reports a constant level of pain that is especially severe at night and keeps him from sleeping.  I did not appreciate any obvious structural abnormality on exam or plain x-ray to possibly explain his symptoms so I ordered an MRI of his left ankle and hindfoot along with EMG nerve conduction studies to rule out a more central cause of neurogenic pain.  I prescribed tramadol and gabapentin as well as a Medrol Dosepak and he has not had any significant relief except for some temporary relief with tramadol.      MRI results:  The MRI of the left ankle and hindfoot did not reveal any obvious structural abnormalities.  It was a normal study.    EMG/nerve conduction study results:  Normal study    Examination:  He had me palpate an area just superior to the abductor hallucis at the level of the 1st metatarsal where he senses an abnormality which corresponds to the focus of his pain.  I did not really appreciate any obvious palpable abnormality but he is tender in this area.  Otherwise his examination is unchanged from previous      Impression:  Neurogenic pain left foot, possible medial plantar neuritis    Recommendation:  I told Dr. Ragsdale that I would confer with  regarding the adequacy of the ankle/hindfoot MRI and whether or not I should obtain a midfoot/forefoot MRI and possibly include contrast to evaluate this area looking for something that might explain his pain as he is not getting any relief with time or other conservative measures.  If necessary I will go ahead and order a new MRI.

## 2020-08-25 ENCOUNTER — TELEPHONE (OUTPATIENT)
Dept: ORTHOPEDICS | Facility: CLINIC | Age: 40
End: 2020-08-25

## 2020-08-25 DIAGNOSIS — M79.2 NEUROGENIC PAIN OF LEFT FOOT: Primary | ICD-10-CM

## 2020-08-31 ENCOUNTER — HOSPITAL ENCOUNTER (OUTPATIENT)
Dept: RADIOLOGY | Facility: HOSPITAL | Age: 40
Discharge: HOME | End: 2020-08-31
Attending: ORTHOPAEDIC SURGERY
Payer: COMMERCIAL

## 2020-08-31 DIAGNOSIS — M79.2 NEUROGENIC PAIN OF LEFT FOOT: ICD-10-CM

## 2020-08-31 PROCEDURE — 25500020 PHARM REV CODE 255: Performed by: ORTHOPAEDIC SURGERY

## 2020-08-31 PROCEDURE — 73720 MRI FOOT (MIDFOOT) LEFT W W/O CONTRAST: ICD-10-PCS | Mod: 26,LT,, | Performed by: RADIOLOGY

## 2020-08-31 PROCEDURE — A9585 GADOBUTROL INJECTION: HCPCS | Performed by: ORTHOPAEDIC SURGERY

## 2020-08-31 PROCEDURE — 73720 MRI LWR EXTREMITY W/O&W/DYE: CPT | Mod: TC,LT

## 2020-08-31 PROCEDURE — 73720 MRI LWR EXTREMITY W/O&W/DYE: CPT | Mod: 26,LT,, | Performed by: RADIOLOGY

## 2020-08-31 RX ORDER — GADOBUTROL 604.72 MG/ML
9 INJECTION INTRAVENOUS
Status: COMPLETED | OUTPATIENT
Start: 2020-08-31 | End: 2020-08-31

## 2020-08-31 RX ADMIN — GADOBUTROL 9 ML: 604.72 INJECTION INTRAVENOUS at 06:08

## 2020-09-15 ENCOUNTER — HOSPITAL ENCOUNTER (OUTPATIENT)
Dept: RADIOLOGY | Facility: HOSPITAL | Age: 40
Discharge: HOME | End: 2020-09-15
Attending: INTERNAL MEDICINE
Payer: COMMERCIAL

## 2020-09-15 ENCOUNTER — OFFICE VISIT (OUTPATIENT)
Dept: HEMATOLOGY/ONCOLOGY | Facility: CLINIC | Age: 40
End: 2020-09-15
Payer: COMMERCIAL

## 2020-09-15 ENCOUNTER — TELEPHONE (OUTPATIENT)
Dept: GASTROENTEROLOGY | Facility: CLINIC | Age: 40
End: 2020-09-15

## 2020-09-15 DIAGNOSIS — D68.9 COAGULOPATHY: ICD-10-CM

## 2020-09-15 DIAGNOSIS — C79.9 METASTATIC DISEASE: Primary | ICD-10-CM

## 2020-09-15 DIAGNOSIS — D69.6 THROMBOCYTOPENIA: ICD-10-CM

## 2020-09-15 DIAGNOSIS — D53.9 MACROCYTIC ANEMIA: ICD-10-CM

## 2020-09-15 DIAGNOSIS — T14.8XXA: ICD-10-CM

## 2020-09-15 DIAGNOSIS — R79.89 ELEVATED LFTS: ICD-10-CM

## 2020-09-15 DIAGNOSIS — C79.9 METASTATIC DISEASE: ICD-10-CM

## 2020-09-15 DIAGNOSIS — R16.0 LIVER MASS: ICD-10-CM

## 2020-09-15 PROCEDURE — 71260 CT CHEST ABDOMEN PELVIS WITH CONTRAST (XPD): ICD-10-PCS | Mod: 26,,, | Performed by: RADIOLOGY

## 2020-09-15 PROCEDURE — 99205 PR OFFICE/OUTPT VISIT, NEW, LEVL V, 60-74 MIN: ICD-10-PCS | Mod: S$GLB,,, | Performed by: INTERNAL MEDICINE

## 2020-09-15 PROCEDURE — 74177 CT CHEST ABDOMEN PELVIS WITH CONTRAST (XPD): ICD-10-PCS | Mod: 26,,, | Performed by: RADIOLOGY

## 2020-09-15 PROCEDURE — 71260 CT THORAX DX C+: CPT | Mod: 26,,, | Performed by: RADIOLOGY

## 2020-09-15 PROCEDURE — 70470 CT HEAD/BRAIN W/O & W/DYE: CPT | Mod: TC

## 2020-09-15 PROCEDURE — 99999 PR PBB SHADOW E&M-EST. PATIENT-LVL II: CPT | Mod: PBBFAC,,, | Performed by: INTERNAL MEDICINE

## 2020-09-15 PROCEDURE — 99205 OFFICE O/P NEW HI 60 MIN: CPT | Mod: S$GLB,,, | Performed by: INTERNAL MEDICINE

## 2020-09-15 PROCEDURE — 74177 CT ABD & PELVIS W/CONTRAST: CPT | Mod: 26,,, | Performed by: RADIOLOGY

## 2020-09-15 PROCEDURE — 25500020 PHARM REV CODE 255: Performed by: INTERNAL MEDICINE

## 2020-09-15 PROCEDURE — 70470 CT HEAD WITH AND WITHOUT: ICD-10-PCS | Mod: 26,,, | Performed by: RADIOLOGY

## 2020-09-15 PROCEDURE — 99999 PR PBB SHADOW E&M-EST. PATIENT-LVL II: ICD-10-PCS | Mod: PBBFAC,,, | Performed by: INTERNAL MEDICINE

## 2020-09-15 PROCEDURE — 74177 CT ABD & PELVIS W/CONTRAST: CPT | Mod: TC

## 2020-09-15 PROCEDURE — 70470 CT HEAD/BRAIN W/O & W/DYE: CPT | Mod: 26,,, | Performed by: RADIOLOGY

## 2020-09-15 RX ADMIN — IOHEXOL 100 ML: 350 INJECTION, SOLUTION INTRAVENOUS at 03:09

## 2020-09-15 NOTE — PROGRESS NOTES
Hematology and Medical Oncology   New Patient Consult     09/15/2020    Reason For Referral: Echymosis    History of Present Ilness:   Сергей Ragsdale (Rubio) is a pleasant 40 y.o.male who presents to clinic today to discuss rapidly evolving symptoms. About 3 months ago he began to experience medial left foot pain. That in time has extended to the lateral foot, calf and now knee. This pain is constant and debilitating greatly impacting his quality of life. He has worked closely with multiple podiatrist and ortho, tried injections, steroids and gabapentin which have not provided relief. As a result of the extreme pain he is sleeping 1-2 hours a night.     There has been a rapid escalation of symptoms in the last two weeks, which include: worsening pain,+3 pitting edema of the left leg, extensive ecchymosis, high volume bright red blood per rectum that will run down his leg and onto the floor, he vomits 4-5 times a day, and has experienced an unintentional 20 pound weight loss.     Yesterday at home he began experiencing loss of sensation and proprioception to the left leg which resulted in a fall. There was no loss of consciousness or head injury.     In the office today when he was transitioning from sitting to standing there was a momentary balance difficulty.    PAST MEDICAL HISTORY:   Past Medical History:   Diagnosis Date    Elevated LFTs     HTN (hypertension)     Hyperlipidemia        PAST SURGICAL HISTORY:   Past Surgical History:   Procedure Laterality Date    TONSILLECTOMY         PAST SOCIAL HISTORY:   reports that he has never smoked. He has never used smokeless tobacco. He reports current alcohol use. He reports that he does not use drugs.    FAMILY HISTORY:  Family History   Problem Relation Age of Onset    Hypertension Mother     Hypertension Father     No Known Problems Sister     Congenital heart disease Brother     Coronary artery disease Maternal Grandfather     Cancer  Paternal Grandmother         breast    Coronary artery disease Paternal Grandfather     No Known Problems Brother        CURRENT MEDICATIONS:   Current Outpatient Medications   Medication Sig    gabapentin (NEURONTIN) 300 MG capsule Take 2 capsules (600 mg total) by mouth every evening.    meloxicam (MOBIC) 15 MG tablet Take 1 tablet (15 mg total) by mouth once daily.    methylPREDNISolone (MEDROL, MEKHI,) 4 mg tablet Take as instructed on package    nebivoloL (BYSTOLIC) 10 MG Tab Take 1 tablet (10 mg total) by mouth once daily.     No current facility-administered medications for this visit.      ALLERGIES:   Review of patient's allergies indicates:  No Known Allergies      Review of Systems:     Review of Systems   Constitutional: Positive for appetite change, fatigue and unexpected weight change. Negative for chills, diaphoresis and fever.   HENT:   Negative for hearing loss, mouth sores, nosebleeds, sore throat, trouble swallowing and voice change.    Eyes: Negative for eye problems and icterus.   Respiratory: Negative for chest tightness, cough, hemoptysis, shortness of breath and wheezing.    Cardiovascular: Positive for leg swelling. Negative for chest pain and palpitations.   Gastrointestinal: Positive for blood in stool and vomiting. Negative for abdominal distention, abdominal pain, diarrhea and nausea.   Endocrine: Negative for hot flashes.   Genitourinary: Negative for bladder incontinence, difficulty urinating, dysuria and hematuria.    Musculoskeletal: Positive for arthralgias and gait problem. Negative for back pain, flank pain, myalgias, neck pain and neck stiffness.   Skin: Negative for itching, rash and wound.   Neurological: Positive for dizziness, extremity weakness, gait problem and numbness. Negative for headaches, seizures and speech difficulty.   Hematological: Negative for adenopathy. Bruises/bleeds easily.   Psychiatric/Behavioral: Positive for sleep disturbance. Negative for confusion  and depression. The patient is not nervous/anxious.           Physical Exam:     There were no vitals filed for this visit.  Physical Exam  Constitutional:       General: He is not in acute distress.     Appearance: He is well-developed. He is not diaphoretic.      Comments: Well dress gentleman with jaundiced eyes   HENT:      Head: Normocephalic and atraumatic.      Mouth/Throat:      Pharynx: No oropharyngeal exudate.   Eyes:      General: Scleral icterus present.      Conjunctiva/sclera: Conjunctivae normal.      Pupils: Pupils are equal, round, and reactive to light.   Neck:      Musculoskeletal: Normal range of motion and neck supple.      Thyroid: No thyromegaly.      Vascular: No JVD.      Trachea: No tracheal deviation.   Cardiovascular:      Rate and Rhythm: Normal rate and regular rhythm.      Heart sounds: Normal heart sounds. No murmur. No friction rub.   Pulmonary:      Effort: Pulmonary effort is normal. No respiratory distress.      Breath sounds: Normal breath sounds. No stridor. No wheezing or rales.   Chest:      Chest wall: No tenderness.   Abdominal:      General: Bowel sounds are normal. There is no distension.      Palpations: Abdomen is soft.      Tenderness: There is no abdominal tenderness. There is no guarding or rebound.   Musculoskeletal: Normal range of motion.         General: No tenderness or deformity.   Skin:     General: Skin is warm and dry.      Capillary Refill: Capillary refill takes less than 2 seconds.      Coloration: Skin is not pale.      Findings: Bruising (on all extremities) present. No erythema or rash.   Neurological:      Mental Status: He is alert and oriented to person, place, and time.      Cranial Nerves: No cranial nerve deficit.      Sensory: No sensory deficit.      Motor: No abnormal muscle tone.      Coordination: Coordination normal.      Gait: Gait abnormal.      Deep Tendon Reflexes: Reflexes normal.   Psychiatric:         Behavior: Behavior normal.          Thought Content: Thought content normal.         Judgment: Judgment normal.         ECOG Performance Status: (foot note - ECOG PS provided by Eastern Cooperative Oncology Group) 1 - Symptomatic but completely ambulatory    Karnofsky Performance Score:  80%- Normal Activity with Effort: Some Symptoms of Disease    Labs:   Lab Results   Component Value Date    WBC 7.29 09/15/2020    HGB 9.7 (L) 09/15/2020    HCT 29.3 (L) 09/15/2020     (L) 09/15/2020    CHOL 239 (H) 12/09/2019    TRIG 146 12/09/2019    HDL 40 12/09/2019    ALT 40 09/15/2020     (H) 09/15/2020     09/15/2020    K 3.7 09/15/2020     09/15/2020    CREATININE 0.9 09/15/2020    BUN 6 09/15/2020    CO2 25 09/15/2020    TSH 0.795 12/09/2019    PSA 0.44 09/15/2020    INR 1.2 09/15/2020    HGBA1C 5.0 12/09/2019     Iron:71  TIBC: 231  Ferritin: 661 [elevated]    Retic:3.7 [elevated]  Fibrinogen: 328    Protime:13.4 [elevated]  PTT: 30.5   INR: 1.2 upper limit of normal    D dimer: 1.22 [elevated]  LDH: 397 [elevated]    PSA: 0.44    Imaging:     CT Chest/Abdomen/Pelvis: The liver is enlarged in size, measuring 19 cm in craniocaudal diameter.  Diffuse heterogeneous enhancement of the hepatic parenchyma.  Ill-defined hypoenhancing lesion of the caudate lobe approximately measuring 5.9 x 5.8 cm with mass effect on the IVC.  The main portal vein, right and left portal veins are patent.  The hepatic arteries are patent.  Few subcentimeter para-aortic and pericaval lymph nodes, none enlarged per CT criteria.  Mild peritoneal nodularity in the right upper abdomen as seen on axial series 2, image 147.  Small volume of free fluid in the pelvis.  Multiple small cristiana hepatis lymph nodes measuring up to 1 cm (axial series 2, image 102).     Findings could be related to nonuniform fibrofatty infiltration and DAVENPORT cirrhosis, but multifocal primary or metastatic hepatic malignancy including cholangiocarcinoma are also to be considered and should  be included in the differential diagnosis.    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    Ecchymosis  --Initial concern was of an acute bone marrow failure vs leukemia given the number and size of non traumatic bruises.  --Labs were unrevealing for a white cell disorder  --Hematopathology attending reviewed his peripheral smear and concluded there was atypical cells seen  --Coagulation labs are slightly atypical, with an INR at the upper limit of normal and PT prolonged    Liver Mass  --Rising liver function and bilirubin gave me concern for a malignant etiology  --CT Chest/Abd Pelvis reveal a  5.9 x 5.8 cm mass in the caudate lobe  --This has been discussed with GI and GI oncology Dr. Edwards as well as Dr. Javier. We are in agreement to directly admit him tomorrow given the coagulopathy and need for multiple time sensitive procedures. Initial concern is for cholangiocarcinoma.    Robinson Instability  --Rapidly evolving over the last 36 hours  --CT head is unrevealing for a grossly obvious mass, but an MRI and full neurologic workup is indicated    Macrocytic anemia  --This is a compensatory response to ongoing GI blood loss  --Retic count is appropriately elevated in an attempt to replace the lost blood    Elevated Inflammatory Markers  --Likely a response to the ongoing disease process and blood loss      60 minutes were spent face to face with the patient to discuss the disease, natural history, treatment options and survival statistics. I have provided the patient with an opportunity to ask questions and have all questions answered to his satisfaction.       At this point I will refer care to our medical oncology group for further workup of a solid tumor mass, but he knows to call in the interim if symptoms change or should a problem  arise.      Brianna Villar MD  Hematology and Medical Oncology  Bone Marrow Transplant  [facility]

## 2020-09-15 NOTE — Clinical Note
Imaging has been formally reviewed by radiology. The caudate lobe lesion is 5.8x5.9cm and there is some free fluid in the abdomen. Thank you guys for being involved in this case and since there is no hem malignancy component I will turn this over to your expertise. The patient is aware.

## 2020-09-15 NOTE — TELEPHONE ENCOUNTER
MA spoke with Renetta. She was offered a virtual visit appointment today for 4:30.     She will call back today to confirm appointment.

## 2020-09-15 NOTE — TELEPHONE ENCOUNTER
----- Message from Alyson Padilla MA sent at 9/11/2020  5:16 PM CDT -----  Contact: Renetta WHITLEY)    ----- Message -----  From: Leon Bertrand  Sent: 9/11/2020   2:38 PM CDT  To: [facility] Staff    Calling to speak with staff re pt, would like to speak with staff before scheduling appt       Please contact Renetta (BALWINDER): [phone]

## 2020-09-16 ENCOUNTER — TELEPHONE (OUTPATIENT)
Dept: HEMATOLOGY/ONCOLOGY | Facility: CLINIC | Age: 40
End: 2020-09-16

## 2020-09-16 ENCOUNTER — HOSPITAL ENCOUNTER (INPATIENT)
Facility: HOSPITAL | Age: 40
LOS: 2 days | Discharge: HOME | DRG: 378 | End: 2020-09-18
Attending: INTERNAL MEDICINE | Admitting: INTERNAL MEDICINE
Payer: COMMERCIAL

## 2020-09-16 ENCOUNTER — ANESTHESIA EVENT (OUTPATIENT)
Dept: ENDOSCOPY | Facility: HOSPITAL | Age: 40
DRG: 378 | End: 2020-09-16
Payer: COMMERCIAL

## 2020-09-16 DIAGNOSIS — D69.6 THROMBOCYTOPENIA: ICD-10-CM

## 2020-09-16 DIAGNOSIS — G47.00 INSOMNIA, UNSPECIFIED TYPE: ICD-10-CM

## 2020-09-16 DIAGNOSIS — R16.0 LIVER MASS: ICD-10-CM

## 2020-09-16 DIAGNOSIS — D68.9 COAGULOPATHY: ICD-10-CM

## 2020-09-16 DIAGNOSIS — M79.2 NEUROGENIC PAIN, LEG, LEFT: ICD-10-CM

## 2020-09-16 DIAGNOSIS — E78.49 OTHER HYPERLIPIDEMIA: ICD-10-CM

## 2020-09-16 DIAGNOSIS — R22.9 MULTIPLE SKIN NODULES: ICD-10-CM

## 2020-09-16 DIAGNOSIS — F32.A DEPRESSION, UNSPECIFIED DEPRESSION TYPE: Chronic | ICD-10-CM

## 2020-09-16 DIAGNOSIS — K92.1 HEMATOCHEZIA: ICD-10-CM

## 2020-09-16 DIAGNOSIS — R79.89 ELEVATED LFTS: ICD-10-CM

## 2020-09-16 DIAGNOSIS — R11.2 NAUSEA AND VOMITING, INTRACTABILITY OF VOMITING NOT SPECIFIED, UNSPECIFIED VOMITING TYPE: ICD-10-CM

## 2020-09-16 DIAGNOSIS — D53.9 MACROCYTIC ANEMIA: ICD-10-CM

## 2020-09-16 DIAGNOSIS — R16.0 LIVER MASS: Primary | ICD-10-CM

## 2020-09-16 DIAGNOSIS — K74.60 CIRRHOSIS OF LIVER WITHOUT ASCITES, UNSPECIFIED HEPATIC CIRRHOSIS TYPE: ICD-10-CM

## 2020-09-16 DIAGNOSIS — F19.90 EXCESSIVE USE OF NONSTEROIDAL ANTI-INFLAMMATORY DRUG (NSAID): ICD-10-CM

## 2020-09-16 DIAGNOSIS — T14.8XXA: ICD-10-CM

## 2020-09-16 DIAGNOSIS — R79.89 ELEVATED LFTS: Primary | ICD-10-CM

## 2020-09-16 DIAGNOSIS — I10 HYPERTENSION, UNSPECIFIED TYPE: ICD-10-CM

## 2020-09-16 DIAGNOSIS — R52 PAIN: ICD-10-CM

## 2020-09-16 DIAGNOSIS — F33.2 SEVERE EPISODE OF RECURRENT MAJOR DEPRESSIVE DISORDER, WITHOUT PSYCHOTIC FEATURES: ICD-10-CM

## 2020-09-16 DIAGNOSIS — Z51.5 PALLIATIVE CARE ENCOUNTER: ICD-10-CM

## 2020-09-16 LAB
AFP SERPL-MCNC: 6.7 NG/ML (ref 0–8.4)
CANCER AG19-9 SERPL-ACNC: 38 U/ML (ref 2–40)
HAV IGM SERPL QL IA: NEGATIVE
HBV CORE AB SERPL QL IA: NEGATIVE
HBV CORE IGM SERPL QL IA: NEGATIVE
HBV SURFACE AB SER-ACNC: POSITIVE M[IU]/ML
HBV SURFACE AG SERPL QL IA: NEGATIVE
HBV SURFACE AG SERPL QL IA: NEGATIVE
HCV AB SERPL QL IA: NEGATIVE
SARS-COV-2 RDRP RESP QL NAA+PROBE: NEGATIVE

## 2020-09-16 PROCEDURE — 99223 PR INITIAL HOSPITAL CARE,LEVL III: ICD-10-PCS | Mod: ,,, | Performed by: EMERGENCY MEDICINE

## 2020-09-16 PROCEDURE — 80074 ACUTE HEPATITIS PANEL: CPT

## 2020-09-16 PROCEDURE — 25500020 PHARM REV CODE 255: Performed by: INTERNAL MEDICINE

## 2020-09-16 PROCEDURE — 90837 PR PSYCHOTHERAPY W/PATIENT, 60 MIN: ICD-10-PCS | Mod: ,,, | Performed by: PSYCHOLOGIST

## 2020-09-16 PROCEDURE — 99223 1ST HOSP IP/OBS HIGH 75: CPT | Mod: ,,, | Performed by: EMERGENCY MEDICINE

## 2020-09-16 PROCEDURE — 82105 ALPHA-FETOPROTEIN SERUM: CPT

## 2020-09-16 PROCEDURE — 86301 IMMUNOASSAY TUMOR CA 19-9: CPT

## 2020-09-16 PROCEDURE — A9585 GADOBUTROL INJECTION: HCPCS | Performed by: INTERNAL MEDICINE

## 2020-09-16 PROCEDURE — 90837 PSYTX W PT 60 MINUTES: CPT | Mod: ,,, | Performed by: PSYCHOLOGIST

## 2020-09-16 PROCEDURE — 86706 HEP B SURFACE ANTIBODY: CPT

## 2020-09-16 PROCEDURE — 25000003 PHARM REV CODE 250

## 2020-09-16 PROCEDURE — 87350 HEPATITIS BE AG IA: CPT

## 2020-09-16 PROCEDURE — 20600001 HC STEP DOWN PRIVATE ROOM

## 2020-09-16 PROCEDURE — 25000003 PHARM REV CODE 250: Performed by: EMERGENCY MEDICINE

## 2020-09-16 PROCEDURE — 86704 HEP B CORE ANTIBODY TOTAL: CPT

## 2020-09-16 PROCEDURE — 63600175 PHARM REV CODE 636 W HCPCS: Performed by: INTERNAL MEDICINE

## 2020-09-16 PROCEDURE — U0002 COVID-19 LAB TEST NON-CDC: HCPCS

## 2020-09-16 PROCEDURE — 25000003 PHARM REV CODE 250: Performed by: INTERNAL MEDICINE

## 2020-09-16 RX ORDER — GADOBUTROL 604.72 MG/ML
10 INJECTION INTRAVENOUS
Status: COMPLETED | OUTPATIENT
Start: 2020-09-16 | End: 2020-09-16

## 2020-09-16 RX ORDER — POLYETHYLENE GLYCOL 3350, SODIUM SULFATE ANHYDROUS, SODIUM BICARBONATE, SODIUM CHLORIDE, POTASSIUM CHLORIDE 236; 22.74; 6.74; 5.86; 2.97 G/4L; G/4L; G/4L; G/4L; G/4L
4000 POWDER, FOR SOLUTION ORAL ONCE
Status: COMPLETED | OUTPATIENT
Start: 2020-09-16 | End: 2020-09-16

## 2020-09-16 RX ORDER — OXYCODONE HYDROCHLORIDE 5 MG/1
5 TABLET ORAL EVERY 6 HOURS PRN
Status: DISCONTINUED | OUTPATIENT
Start: 2020-09-16 | End: 2020-09-18 | Stop reason: HOSPADM

## 2020-09-16 RX ORDER — SODIUM CHLORIDE 0.9 % (FLUSH) 0.9 %
10 SYRINGE (ML) INJECTION
Status: DISCONTINUED | OUTPATIENT
Start: 2020-09-16 | End: 2020-09-18 | Stop reason: HOSPADM

## 2020-09-16 RX ORDER — NEBIVOLOL 5 MG/1
10 TABLET ORAL DAILY
Status: DISCONTINUED | OUTPATIENT
Start: 2020-09-17 | End: 2020-09-18 | Stop reason: HOSPADM

## 2020-09-16 RX ORDER — ONDANSETRON 2 MG/ML
8 INJECTION INTRAMUSCULAR; INTRAVENOUS EVERY 8 HOURS PRN
Status: DISCONTINUED | OUTPATIENT
Start: 2020-09-16 | End: 2020-09-18 | Stop reason: HOSPADM

## 2020-09-16 RX ORDER — GABAPENTIN 300 MG/1
600 CAPSULE ORAL 3 TIMES DAILY
Status: DISCONTINUED | OUTPATIENT
Start: 2020-09-16 | End: 2020-09-16

## 2020-09-16 RX ORDER — MORPHINE SULFATE 15 MG/1
15 TABLET, FILM COATED, EXTENDED RELEASE ORAL NIGHTLY
Status: COMPLETED | OUTPATIENT
Start: 2020-09-16 | End: 2020-09-17

## 2020-09-16 RX ORDER — GABAPENTIN 300 MG/1
300 CAPSULE ORAL 3 TIMES DAILY
Status: DISCONTINUED | OUTPATIENT
Start: 2020-09-16 | End: 2020-09-17

## 2020-09-16 RX ORDER — MORPHINE SULFATE 2 MG/ML
4 INJECTION, SOLUTION INTRAMUSCULAR; INTRAVENOUS
Status: DISCONTINUED | OUTPATIENT
Start: 2020-09-16 | End: 2020-09-18 | Stop reason: HOSPADM

## 2020-09-16 RX ORDER — NORTRIPTYLINE HYDROCHLORIDE 25 MG/1
25 CAPSULE ORAL NIGHTLY
Status: DISCONTINUED | OUTPATIENT
Start: 2020-09-16 | End: 2020-09-18 | Stop reason: HOSPADM

## 2020-09-16 RX ADMIN — GABAPENTIN 300 MG: 300 CAPSULE ORAL at 08:09

## 2020-09-16 RX ADMIN — GABAPENTIN 600 MG: 300 CAPSULE ORAL at 04:09

## 2020-09-16 RX ADMIN — NORTRIPTYLINE HYDROCHLORIDE 25 MG: 25 CAPSULE ORAL at 09:09

## 2020-09-16 RX ADMIN — MORPHINE SULFATE 15 MG: 15 TABLET, FILM COATED, EXTENDED RELEASE ORAL at 08:09

## 2020-09-16 RX ADMIN — POLYETHYLENE GLYCOL 3350, SODIUM SULFATE ANHYDROUS, SODIUM BICARBONATE, SODIUM CHLORIDE, POTASSIUM CHLORIDE 4000 ML: 236; 22.74; 6.74; 5.86; 2.97 POWDER, FOR SOLUTION ORAL at 08:09

## 2020-09-16 RX ADMIN — ONDANSETRON 8 MG: 2 INJECTION INTRAMUSCULAR; INTRAVENOUS at 09:09

## 2020-09-16 RX ADMIN — MORPHINE SULFATE 4 MG: 2 INJECTION, SOLUTION INTRAMUSCULAR; INTRAVENOUS at 08:09

## 2020-09-16 RX ADMIN — GADOBUTROL 10 ML: 604.72 INJECTION INTRAVENOUS at 03:09

## 2020-09-16 RX ADMIN — MORPHINE SULFATE 4 MG: 2 INJECTION, SOLUTION INTRAMUSCULAR; INTRAVENOUS at 04:09

## 2020-09-16 RX ADMIN — MORPHINE SULFATE 4 MG: 2 INJECTION, SOLUTION INTRAMUSCULAR; INTRAVENOUS at 01:09

## 2020-09-16 NOTE — ANESTHESIA PREPROCEDURE EVALUATION
[facility]  Anesthesia Pre-Operative Evaluation         Patient Name: Сергей Ragsdale  YOB: 1980  MRN: [id number]    SUBJECTIVE:     Pre-operative evaluation for Procedure(s) (LRB):  EGD (ESOPHAGOGASTRODUODENOSCOPY) (N/A)  COLONOSCOPY (N/A)     09/16/2020    Сергей Ragsdale is a 40 y.o. male w/ a significant PMHx of hematochezia, HTN, HLD.  Daily recurrence of bloody stool 3 to 4 times a day. Excessive amounts of anti-inflammatories due to undiagnosed ankle/foot. Became concerned about his symptoms yesterday after he experienced a fall at home.  Patient is a GI oncologist.    Patient now presents for the above procedure(s).      LDA: None documented.       Prev airway: None documented.    Drips: None documented.      Patient Active Problem List   Diagnosis    Elevated LFTs    HTN (hypertension)    Other hyperlipidemia    Liver mass    Coagulopathy    Macrocytic anemia    Thrombocytopenia    Ecchymoma       Review of patient's allergies indicates:  No Known Allergies    Current Inpatient Medications:   gabapentin  600 mg Oral TID    polyethylene glycol  4,000 mL Oral Once       No current facility-administered medications on file prior to encounter.      Current Outpatient Medications on File Prior to Encounter   Medication Sig Dispense Refill    gabapentin (NEURONTIN) 300 MG capsule Take 2 capsules (600 mg total) by mouth every evening. 60 capsule 11    nebivoloL (BYSTOLIC) 10 MG Tab Take 1 tablet (10 mg total) by mouth once daily. 90 tablet 12       Past Surgical History:   Procedure Laterality Date    TONSILLECTOMY         Social History     Socioeconomic History    Marital status: Unknown     Spouse name: Not on file    Number of children: Not on file    Years of education: Not on file    Highest education level: Not on file   Occupational  History    Not on file   Social Needs    Financial resource strain: Not on file    Food insecurity     Worry: Not on file     Inability: Not on file    Transportation needs     Medical: Not on file     Non-medical: Not on file   Tobacco Use    Smoking status: Never Smoker    Smokeless tobacco: Never Used   Substance and Sexual Activity    Alcohol use: Yes     Comment: socially    Drug use: Never    Sexual activity: Yes     Partners: Female   Lifestyle    Physical activity     Days per week: Not on file     Minutes per session: Not on file    Stress: Not on file   Relationships    Social connections     Talks on phone: Not on file     Gets together: Not on file     Attends [employer] service: Not on file     Active member of club or organization: Not on file     Attends meetings of clubs or organizations: Not on file     Relationship status: Not on file   Other Topics Concern    Not on file   Social History Narrative    Not on file       OBJECTIVE:     Vital Signs Range (Last 24H):  Temp:  [36.8 °C (98.3 °F)]   Resp:  [20]       Significant Labs:  Lab Results   Component Value Date    WBC 7.29 09/15/2020    HGB 9.7 (L) 09/15/2020    HCT 29.3 (L) 09/15/2020     (L) 09/15/2020    CHOL 239 (H) 12/09/2019    TRIG 146 12/09/2019    HDL 40 12/09/2019    ALT 40 09/15/2020     (H) 09/15/2020     09/15/2020    K 3.7 09/15/2020     09/15/2020    CREATININE 0.9 09/15/2020    BUN 6 09/15/2020    CO2 25 09/15/2020    TSH 0.795 12/09/2019    PSA 0.44 09/15/2020    INR 1.2 09/15/2020    HGBA1C 5.0 12/09/2019       Diagnostic Studies: No relevant studies.    EKG:   No results found for this or any previous visit.    2D ECHO:  TTE:  No results found for this or any previous visit.    ARVIN:  No results found for this or any previous visit.    ASSESSMENT/PLAN:         Anesthesia Evaluation    I have reviewed the Patient Summary Reports.   I have reviewed the NPO Status.      Review of  Systems  Anesthesia Hx:  No problems with previous Anesthesia Denies Hx of Anesthetic complications  History of prior surgery of interest to airway management or planning:  Denies Personal Hx of Anesthesia complications.   Hematology/Oncology:         -- Anemia:   EENT/Dental:EENT/Dental Normal   Cardiovascular:   Hypertension hyperlipidemia    Pulmonary:  Pulmonary Normal    Renal/:  Renal/ Normal     Hepatic/GI:  Hepatic/GI Normal    Musculoskeletal:  Musculoskeletal Normal    Neurological:  Neurology Normal    Endocrine:  Endocrine Normal        Physical Exam  General:  Well nourished    Airway/Jaw/Neck:  Airway Findings: Mouth Opening: Normal Tongue: Normal  General Airway Assessment: Adult  Mallampati: I  TM Distance: Normal, at least 6 cm         Dental:  Dental Findings: In tact   Chest/Lungs:  Chest/Lungs Findings: Clear to auscultation, Normal Respiratory Rate     Heart/Vascular:  Heart Findings: Rate: Normal  Rhythm: Regular Rhythm     Abdomen:  Abdomen Findings: Normal    Musculoskeletal:  Musculoskeletal Findings: Normal    Mental Status:  Mental Status Findings:  Cooperative, Alert and Oriented         Anesthesia Plan  Type of Anesthesia, risks & benefits discussed:  Anesthesia Type:  general, MAC  Patient's Preference:   Intra-op Monitoring Plan: standard ASA monitors  Intra-op Monitoring Plan Comments:   Post Op Pain Control Plan: multimodal analgesia, IV/PO Opioids PRN and per primary service following discharge from PACU  Post Op Pain Control Plan Comments:   Induction:   IV  Beta Blocker:  Patient is on a Beta-Blocker and has not received dose within the past 24 hours due to non-compliance or for other reasons (Patient should receive a perioperative dose or document why it is withheld). Perioperative Beta Blocker not given due to: Other (see comments)      Informed Consent: Patient understands risks and agrees with Anesthesia plan.  Questions answered. Anesthesia consent signed with  patient.  ASA Score: 2     Day of Surgery Review of History & Physical:    H&P update referred to the provider.     Anesthesia Plan Notes: GI bleed        Ready For Surgery From Anesthesia Perspective.

## 2020-09-16 NOTE — CONSULTS
Brief consult note:     Full consult note to follow.  Discussed with Dr. Villar and Dr. Kwon.     Explored with the pt his current history, worries, and concerns as we look ahead.  During the interview, MRI results returned, perhaps changing the role of pal care. Planning for EGD/colonoscopy tomorrow for GI bleed. Hepatology consult pending.     Recs:   Will formalize advance directives and HCPOA  Add nortriptyline 25 mg qhs for insomnia and left foot pain  Oxycodone 5 mg po q 3 hours pain    Will continue to follow.     Please call with questions.     Tyson Melgar MD  Palliative Medicine  [phone]

## 2020-09-16 NOTE — SUBJECTIVE & OBJECTIVE
Interval History: discussed with primary team    Past Medical History:   Diagnosis Date    Elevated LFTs     HTN (hypertension)     Hyperlipidemia        Past Surgical History:   Procedure Laterality Date    TONSILLECTOMY         Review of patient's allergies indicates:  No Known Allergies    Medications:  Continuous Infusions:  Scheduled Meds:   gabapentin  600 mg Oral TID    polyethylene glycol  4,000 mL Oral Once     PRN Meds:morphine, oxyCODONE, sodium chloride 0.9%    Family History     Problem Relation (Age of Onset)    Cancer Paternal Grandmother    Congenital heart disease Brother    Coronary artery disease Maternal Grandfather, Paternal Grandfather    Hypertension Mother, Father    No Known Problems Sister, Brother        Tobacco Use    Smoking status: Never Smoker    Smokeless tobacco: Never Used   Substance and Sexual Activity    Alcohol use: Yes     Comment: socially    Drug use: Never    Sexual activity: Yes     Partners: Female       Review of Systems   Constitutional: Positive for activity change, fatigue and unexpected weight change.   HENT: Negative.    Eyes: Negative.    Respiratory: Negative.    Cardiovascular: Negative.    Gastrointestinal: Positive for blood in stool. Negative for rectal pain and vomiting.   Endocrine: Negative.    Genitourinary: Negative.    Musculoskeletal:        Left foot pain   Skin: Positive for color change. Negative for pallor.   Allergic/Immunologic: Negative.    Neurological: Negative.    Hematological: Bruises/bleeds easily.   Psychiatric/Behavioral: Positive for dysphoric mood. The patient is nervous/anxious.         History of depression    All other systems reviewed and are negative.       Objective:     Vital Signs (Most Recent):  Temp: 98.3 °F (36.8 °C) (09/16/20 1249)  Pulse: 62 (09/16/20 1249)  Resp: 20 (09/16/20 1344)  BP: 123/81 (09/16/20 1249)  SpO2: 98 % (09/16/20 1249) Vital Signs (24h Range):  Temp:  [98.3 °F (36.8 °C)] 98.3 °F (36.8  °C)  Pulse:  [62] 62  Resp:  [18-20] 20  SpO2:  [98 %] 98 %  BP: (123)/(81) 123/81     Weight: 77.6 kg (171 lb 1.2 oz)  Body mass index is 24.55 kg/m².    Physical Exam  Vitals signs and nursing note reviewed.     Constitutional:       General: He is not in acute distress.     Appearance: He is well-developed. He is not diaphoretic.      Comments: Well dress gentleman with jaundiced eyes   HENT:      Head: Normocephalic and atraumatic.      Mouth/Throat:      Pharynx: No oropharyngeal exudate.   Eyes:      General: Scleral icterus present.      Conjunctiva/sclera: Conjunctivae normal.      Pupils: Pupils are equal, round, and reactive to light.   Neck:      Musculoskeletal: Normal range of motion and neck supple.      Thyroid: No thyromegaly.      Vascular: No JVD.      Trachea: No tracheal deviation.   Cardiovascular:      Rate and Rhythm: Normal rate and regular rhythm.      Heart sounds: Normal heart sounds. No murmur. No friction rub.   Pulmonary:      Effort: Pulmonary effort is normal. No respiratory distress.      Breath sounds: Normal breath sounds. No stridor. No wheezing or rales.   Chest:      Chest wall: No tenderness.   Abdominal:      General: Bowel sounds are normal. There is no distension.      Palpations: Abdomen is soft.      Tenderness: There is no abdominal tenderness. There is no guarding or rebound.   Musculoskeletal: Normal range of motion.         General: No tenderness or deformity.   Skin:     General: Skin is warm and dry.      Capillary Refill: Capillary refill takes less than 2 seconds.      Coloration: Skin is not pale.      Findings: Bruising (on all extremities) present. No erythema or rash.   Neurological:      Mental Status: He is alert and oriented to person, place, and time.      Cranial Nerves: No cranial nerve deficit.      Sensory: No sensory deficit.      Motor: No abnormal muscle tone.      Coordination: Coordination normal.      Gait: Gait abnormal.      Deep Tendon  Reflexes: Reflexes normal.   Psychiatric:         Behavior: Behavior normal.         Thought Content: Thought content normal.         Judgment: Judgment normal.   Review of Symptoms    Symptom Assessment (ESAS 0-10 Scale)  Pain:  6  Dyspnea:  0  Anxiety:  3  Nausea:  0  Depression:  7  Anorexia:  5  Fatigue:  5  Insomnia:  6  Restlessness:  0  Agitation:  0     CAM / Delirium:  Negative  Constipation:  Negative  Diarrhea:  Negative          Performance Status:  90    ECOG Performance Status Grade:  1 - Ambulates, capable of light work    Living Arrangements:  Lives alone    Psychosocial/Cultural:  from Cecily Ragsdale after 12 yr marriage; no kids; recently moved to [facility] for new job and socially isolated since COVID;     Spiritual:  F - Sunshine and Belief:  Did not discuss; grew up with [facility]      Advance Care Planning   Advance Directives:   Living Will: Yes        Copy on chart: No    LaPOST: No    Do Not Resuscitate Status: No    Medical Power of : No    Agent's Name:  Cecily Ragsdale Other [phone]     only call in case of emergency     Decision Making:  Patient answered questions         Significant Labs: All pertinent labs within the past 24 hours have been reviewed.  CBC:   Recent Labs   Lab 09/15/20  1248   WBC 7.29   HGB 9.7*   HCT 29.3*   *   *     BMP:  No results for input(s): GLU, NA, K, CL, CO2, BUN, CREATININE, CALCIUM, MG in the last 24 hours.  LFT:  Lab Results   Component Value Date     (H) 09/15/2020    ALKPHOS 214 (H) 09/15/2020    BILITOT 2.7 (H) 09/15/2020     Albumin:   Albumin   Date Value Ref Range Status   09/15/2020 2.9 (L) 3.5 - 5.2 g/dL Final     Protein:   Total Protein   Date Value Ref Range Status   09/15/2020 7.5 6.0 - 8.4 g/dL Final     Lactic acid:   No results found for: LACTATE    Significant Imaging: CT: I have reviewed all pertinent results/findings within the past 24 hours:   CT Chest/Abdomen/Pelvis: The liver is  enlarged in size, measuring 19 cm in craniocaudal diameter.  Diffuse heterogeneous enhancement of the hepatic parenchyma.  Ill-defined hypoenhancing lesion of the caudate lobe approximately measuring 5.9 x 5.8 cm with mass effect on the IVC.  The main portal vein, right and left portal veins are patent.  The hepatic arteries are patent.  Few subcentimeter para-aortic and pericaval lymph nodes, none enlarged per CT criteria.  Mild peritoneal nodularity in the right upper abdomen as seen on axial series 2, image 147.  Small volume of free fluid in the pelvis.  Multiple small cristiana hepatis lymph nodes measuring up to 1 cm (axial series 2, image 102).      Findings could be related to nonuniform fibrofatty infiltration and DAVENPORT cirrhosis, but multifocal primary or metastatic hepatic malignancy including cholangiocarcinoma are also to be considered and should be included in the differential diagnosis.     CT Head: No evidence of acute intracranial pathology

## 2020-09-16 NOTE — TELEPHONE ENCOUNTER
Permission to enter pt's chart given by Dr. Sandy Javier, director of Hem/Onc.   Reservation placed with Shea.   Dr. Javier to enter admit orders.

## 2020-09-16 NOTE — TELEPHONE ENCOUNTER
"----- Message from Arielle Posadas sent at 9/16/2020  7:11 AM CDT -----  Regarding: Auth #s Approved  Insurance Assist    Name of caller: BRI EPSTEIN   Established or New patient?: established   Contact Preference:  Ph NA   Does Patient feel the need to see the MD today? No   Provider name:  Jian Reed MD   What is the nature of the call?    - two ct's ordered and approved     Auth #'s   [id number]   Ab ct # [id number]  Chest Ct# [id number]    Date of Srv: 9/15 all covered     Additional Notes:   "Thank you for all that you do for our patients'"          "

## 2020-09-17 ENCOUNTER — DOCUMENTATION ONLY (OUTPATIENT)
Dept: PSYCHIATRY | Facility: CLINIC | Age: 40
End: 2020-09-17

## 2020-09-17 ENCOUNTER — ANESTHESIA (OUTPATIENT)
Dept: ENDOSCOPY | Facility: HOSPITAL | Age: 40
DRG: 378 | End: 2020-09-17
Payer: COMMERCIAL

## 2020-09-17 PROBLEM — F33.2 SEVERE EPISODE OF RECURRENT MAJOR DEPRESSIVE DISORDER, WITHOUT PSYCHOTIC FEATURES: Status: ACTIVE | Noted: 2020-09-17

## 2020-09-17 PROBLEM — K74.60 CIRRHOSIS OF LIVER WITHOUT ASCITES: Status: ACTIVE | Noted: 2020-09-15

## 2020-09-17 LAB
ALBUMIN SERPL BCP-MCNC: 2.8 G/DL (ref 3.5–5.2)
ALP SERPL-CCNC: 186 U/L (ref 55–135)
ALT SERPL W/O P-5'-P-CCNC: 36 U/L (ref 10–44)
ANION GAP SERPL CALC-SCNC: 12 MMOL/L (ref 8–16)
AST SERPL-CCNC: 152 U/L (ref 10–40)
BASOPHILS # BLD AUTO: 0.08 K/UL (ref 0–0.2)
BASOPHILS NFR BLD: 1.2 % (ref 0–1.9)
BILIRUB SERPL-MCNC: 3 MG/DL (ref 0.1–1)
BUN SERPL-MCNC: 4 MG/DL (ref 6–20)
CALCIUM SERPL-MCNC: 8.4 MG/DL (ref 8.7–10.5)
CHLORIDE SERPL-SCNC: 106 MMOL/L (ref 95–110)
CO2 SERPL-SCNC: 26 MMOL/L (ref 23–29)
CREAT SERPL-MCNC: 0.7 MG/DL (ref 0.5–1.4)
DIFFERENTIAL METHOD: ABNORMAL
EOSINOPHIL # BLD AUTO: 0.1 K/UL (ref 0–0.5)
EOSINOPHIL NFR BLD: 1.2 % (ref 0–8)
ERYTHROCYTE [DISTWIDTH] IN BLOOD BY AUTOMATED COUNT: 15.8 % (ref 11.5–14.5)
EST. GFR  (AFRICAN AMERICAN): >60 ML/MIN/1.73 M^2
EST. GFR  (NON AFRICAN AMERICAN): >60 ML/MIN/1.73 M^2
GLUCOSE SERPL-MCNC: 94 MG/DL (ref 70–110)
HCT VFR BLD AUTO: 28.4 % (ref 40–54)
HGB BLD-MCNC: 8.9 G/DL (ref 14–18)
IMM GRANULOCYTES # BLD AUTO: 0.03 K/UL (ref 0–0.04)
IMM GRANULOCYTES NFR BLD AUTO: 0.5 % (ref 0–0.5)
LYMPHOCYTES # BLD AUTO: 1.6 K/UL (ref 1–4.8)
LYMPHOCYTES NFR BLD: 23.6 % (ref 18–48)
MCH RBC QN AUTO: 37.1 PG (ref 27–31)
MCHC RBC AUTO-ENTMCNC: 31.3 G/DL (ref 32–36)
MCV RBC AUTO: 118 FL (ref 82–98)
MONOCYTES # BLD AUTO: 0.8 K/UL (ref 0.3–1)
MONOCYTES NFR BLD: 11.5 % (ref 4–15)
NEUTROPHILS # BLD AUTO: 4.1 K/UL (ref 1.8–7.7)
NEUTROPHILS NFR BLD: 62 % (ref 38–73)
NRBC BLD-RTO: 0 /100 WBC
PLATELET # BLD AUTO: 88 K/UL (ref 150–350)
PMV BLD AUTO: 10 FL (ref 9.2–12.9)
POTASSIUM SERPL-SCNC: 3.3 MMOL/L (ref 3.5–5.1)
PROT SERPL-MCNC: 7.3 G/DL (ref 6–8.4)
RBC # BLD AUTO: 2.4 M/UL (ref 4.6–6.2)
SODIUM SERPL-SCNC: 144 MMOL/L (ref 136–145)
WBC # BLD AUTO: 6.6 K/UL (ref 3.9–12.7)

## 2020-09-17 PROCEDURE — 37000009 HC ANESTHESIA EA ADD 15 MINS: Performed by: INTERNAL MEDICINE

## 2020-09-17 PROCEDURE — 37000008 HC ANESTHESIA 1ST 15 MINUTES: Performed by: INTERNAL MEDICINE

## 2020-09-17 PROCEDURE — 25000003 PHARM REV CODE 250: Performed by: EMERGENCY MEDICINE

## 2020-09-17 PROCEDURE — 83921 ORGANIC ACID SINGLE QUANT: CPT

## 2020-09-17 PROCEDURE — 85025 COMPLETE CBC W/AUTO DIFF WBC: CPT

## 2020-09-17 PROCEDURE — 20600001 HC STEP DOWN PRIVATE ROOM

## 2020-09-17 PROCEDURE — D9220A PRA ANESTHESIA: ICD-10-PCS | Mod: ANES,,, | Performed by: ANESTHESIOLOGY

## 2020-09-17 PROCEDURE — 25000003 PHARM REV CODE 250: Performed by: INTERNAL MEDICINE

## 2020-09-17 PROCEDURE — 43239 PR EGD, FLEX, W/BIOPSY, SGL/MULTI: ICD-10-PCS | Mod: 51,,, | Performed by: INTERNAL MEDICINE

## 2020-09-17 PROCEDURE — 63600175 PHARM REV CODE 636 W HCPCS: Performed by: NURSE ANESTHETIST, CERTIFIED REGISTERED

## 2020-09-17 PROCEDURE — 43239 EGD BIOPSY SINGLE/MULTIPLE: CPT | Performed by: INTERNAL MEDICINE

## 2020-09-17 PROCEDURE — 27202087 HC PROBE, APC: Performed by: INTERNAL MEDICINE

## 2020-09-17 PROCEDURE — 45382 PR COLONOSCOPY,FLEX,W/CONTROL, BLEEDING: ICD-10-PCS | Mod: ,,, | Performed by: INTERNAL MEDICINE

## 2020-09-17 PROCEDURE — 43239 EGD BIOPSY SINGLE/MULTIPLE: CPT | Mod: 51,,, | Performed by: INTERNAL MEDICINE

## 2020-09-17 PROCEDURE — 88305 TISSUE EXAM BY PATHOLOGIST: CPT | Performed by: PATHOLOGY

## 2020-09-17 PROCEDURE — D9220A PRA ANESTHESIA: Mod: ANES,,, | Performed by: ANESTHESIOLOGY

## 2020-09-17 PROCEDURE — D9220A PRA ANESTHESIA: ICD-10-PCS | Mod: CRNA,,, | Performed by: NURSE ANESTHETIST, CERTIFIED REGISTERED

## 2020-09-17 PROCEDURE — 45382 COLONOSCOPY W/CONTROL BLEED: CPT | Performed by: INTERNAL MEDICINE

## 2020-09-17 PROCEDURE — 94761 N-INVAS EAR/PLS OXIMETRY MLT: CPT

## 2020-09-17 PROCEDURE — D9220A PRA ANESTHESIA: Mod: CRNA,,, | Performed by: NURSE ANESTHETIST, CERTIFIED REGISTERED

## 2020-09-17 PROCEDURE — 99222 1ST HOSP IP/OBS MODERATE 55: CPT | Mod: ,,, | Performed by: INTERNAL MEDICINE

## 2020-09-17 PROCEDURE — 82607 VITAMIN B-12: CPT

## 2020-09-17 PROCEDURE — 99222 PR INITIAL HOSPITAL CARE,LEVL II: ICD-10-PCS | Mod: ,,, | Performed by: INTERNAL MEDICINE

## 2020-09-17 PROCEDURE — 80053 COMPREHEN METABOLIC PANEL: CPT

## 2020-09-17 PROCEDURE — 63600175 PHARM REV CODE 636 W HCPCS: Performed by: INTERNAL MEDICINE

## 2020-09-17 PROCEDURE — 45382 COLONOSCOPY W/CONTROL BLEED: CPT | Mod: ,,, | Performed by: INTERNAL MEDICINE

## 2020-09-17 PROCEDURE — 36415 COLL VENOUS BLD VENIPUNCTURE: CPT

## 2020-09-17 PROCEDURE — 25000003 PHARM REV CODE 250: Performed by: NURSE ANESTHETIST, CERTIFIED REGISTERED

## 2020-09-17 PROCEDURE — 88305 TISSUE EXAM BY PATHOLOGIST: CPT | Mod: 26,,, | Performed by: PATHOLOGY

## 2020-09-17 PROCEDURE — 88305 TISSUE EXAM BY PATHOLOGIST: ICD-10-PCS | Mod: 26,,, | Performed by: PATHOLOGY

## 2020-09-17 PROCEDURE — 27201012 HC FORCEPS, HOT/COLD, DISP: Performed by: INTERNAL MEDICINE

## 2020-09-17 RX ORDER — PROPOFOL 10 MG/ML
VIAL (ML) INTRAVENOUS CONTINUOUS PRN
Status: DISCONTINUED | OUTPATIENT
Start: 2020-09-17 | End: 2020-09-17

## 2020-09-17 RX ORDER — FENTANYL CITRATE 50 UG/ML
INJECTION, SOLUTION INTRAMUSCULAR; INTRAVENOUS
Status: DISCONTINUED | OUTPATIENT
Start: 2020-09-17 | End: 2020-09-17

## 2020-09-17 RX ORDER — PROPOFOL 10 MG/ML
VIAL (ML) INTRAVENOUS
Status: DISCONTINUED | OUTPATIENT
Start: 2020-09-17 | End: 2020-09-17

## 2020-09-17 RX ORDER — LORAZEPAM 2 MG/ML
1 INJECTION INTRAMUSCULAR EVERY 4 HOURS PRN
Status: DISCONTINUED | OUTPATIENT
Start: 2020-09-17 | End: 2020-09-18 | Stop reason: HOSPADM

## 2020-09-17 RX ORDER — GABAPENTIN 300 MG/1
300 CAPSULE ORAL 2 TIMES DAILY
Status: DISCONTINUED | OUTPATIENT
Start: 2020-09-17 | End: 2020-09-18 | Stop reason: HOSPADM

## 2020-09-17 RX ORDER — SODIUM CHLORIDE 9 MG/ML
INJECTION, SOLUTION INTRAVENOUS CONTINUOUS PRN
Status: DISCONTINUED | OUTPATIENT
Start: 2020-09-17 | End: 2020-09-17

## 2020-09-17 RX ORDER — LIDOCAINE HCL/PF 100 MG/5ML
SYRINGE (ML) INTRAVENOUS
Status: DISCONTINUED | OUTPATIENT
Start: 2020-09-17 | End: 2020-09-17

## 2020-09-17 RX ORDER — PHENYLEPHRINE HYDROCHLORIDE 10 MG/ML
INJECTION INTRAVENOUS
Status: DISCONTINUED | OUTPATIENT
Start: 2020-09-17 | End: 2020-09-17

## 2020-09-17 RX ADMIN — GABAPENTIN 300 MG: 300 CAPSULE ORAL at 08:09

## 2020-09-17 RX ADMIN — PHENYLEPHRINE HYDROCHLORIDE 100 MCG: 10 INJECTION INTRAVENOUS at 12:09

## 2020-09-17 RX ADMIN — NORTRIPTYLINE HYDROCHLORIDE 25 MG: 25 CAPSULE ORAL at 08:09

## 2020-09-17 RX ADMIN — MORPHINE SULFATE 4 MG: 2 INJECTION, SOLUTION INTRAMUSCULAR; INTRAVENOUS at 09:09

## 2020-09-17 RX ADMIN — FENTANYL CITRATE 25 MCG: 50 INJECTION, SOLUTION INTRAMUSCULAR; INTRAVENOUS at 12:09

## 2020-09-17 RX ADMIN — MORPHINE SULFATE 4 MG: 2 INJECTION, SOLUTION INTRAMUSCULAR; INTRAVENOUS at 05:09

## 2020-09-17 RX ADMIN — SODIUM CHLORIDE, SODIUM GLUCONATE, SODIUM ACETATE, POTASSIUM CHLORIDE, MAGNESIUM CHLORIDE, SODIUM PHOSPHATE, DIBASIC, AND POTASSIUM PHOSPHATE: .53; .5; .37; .037; .03; .012; .00082 INJECTION, SOLUTION INTRAVENOUS at 01:09

## 2020-09-17 RX ADMIN — ONDANSETRON 8 MG: 2 INJECTION INTRAMUSCULAR; INTRAVENOUS at 11:09

## 2020-09-17 RX ADMIN — MORPHINE SULFATE 15 MG: 15 TABLET, FILM COATED, EXTENDED RELEASE ORAL at 11:09

## 2020-09-17 RX ADMIN — PROPOFOL 150 MCG/KG/MIN: 10 INJECTION, EMULSION INTRAVENOUS at 12:09

## 2020-09-17 RX ADMIN — PHENYLEPHRINE HYDROCHLORIDE 200 MCG: 10 INJECTION INTRAVENOUS at 01:09

## 2020-09-17 RX ADMIN — MORPHINE SULFATE 4 MG: 2 INJECTION, SOLUTION INTRAMUSCULAR; INTRAVENOUS at 10:09

## 2020-09-17 RX ADMIN — MORPHINE SULFATE 4 MG: 2 INJECTION, SOLUTION INTRAMUSCULAR; INTRAVENOUS at 02:09

## 2020-09-17 RX ADMIN — SODIUM CHLORIDE: 0.9 INJECTION, SOLUTION INTRAVENOUS at 12:09

## 2020-09-17 RX ADMIN — SODIUM CHLORIDE 1000 ML: 0.9 INJECTION, SOLUTION INTRAVENOUS at 10:09

## 2020-09-17 RX ADMIN — NEBIVOLOL HYDROCHLORIDE 10 MG: 5 TABLET ORAL at 08:09

## 2020-09-17 RX ADMIN — MORPHINE SULFATE 4 MG: 2 INJECTION, SOLUTION INTRAMUSCULAR; INTRAVENOUS at 06:09

## 2020-09-17 RX ADMIN — ONDANSETRON 8 MG: 2 INJECTION INTRAMUSCULAR; INTRAVENOUS at 05:09

## 2020-09-17 RX ADMIN — PHENYLEPHRINE HYDROCHLORIDE 100 MCG: 10 INJECTION INTRAVENOUS at 01:09

## 2020-09-17 RX ADMIN — Medication 100 MG: at 12:09

## 2020-09-17 RX ADMIN — PROPOFOL 100 MG: 10 INJECTION, EMULSION INTRAVENOUS at 12:09

## 2020-09-17 NOTE — PLAN OF CARE
Plan of care reviewed with patient .  Fall precautions maintained ,side rails up x2, call light in reach, bed in low position and locked, nonskid socks on.  Patient had ultrasound of lower extremity.  Egd and colonoscopy today.  Ambulating, and voiding.  Patient is still npo.

## 2020-09-17 NOTE — CONSULTS
[facility]  Gastroenterology  Consult Note    Patient Name: Сергей Ragsdale  MRN: [id number]  Admission Date: 9/16/2020  Hospital Length of Stay: 1 days  Code Status: Full Code   Attending Provider: Sandy Javier MD   Consulting Provider: Jv Garces MD  Primary Care Physician: Roney Love MD  Principal Problem:<principal problem not specified>    Inpatient consult to Hepatology  Consult performed by: Jv Garces MD  Consult ordered by: Jv Kwon MD        Subjective:     HPI:  Mr Ragsdale is a 40 year old man with unremarkable medical history who is presenting to hospital with concern for BRBPR and hepatic mass; hepatology consulted due to concern for hepatic mass.    He presented to oncology on 9/16 due to concern for 3 month history of progressive symptoms. Initial symptoms included leg pain which has been unresolving despite therapy including podiatry/ortho, injections, steroids and gabapentin. Over the past 2 weeks has experienced rapid progression with pitting edema, ecchymosis, and brbpr and vomitus and 20lb weight loss. CTAP was concerning for caudate lobe mass (5.9 x 5.8cm with mass effect on IVC, mild peritoneal nodularity and multiple small cristiana hepatis LN. He was admitted for workup.    Labs notable for HepB immunity, HCV negative, anemia (hgb 9.7, baseline 14 12/2019), Tb 2.7, , ALT 40, . CEA 3.0, CA 19-9 38, AFP 6.7. MRI brain, spine negative. MRI triple phase abdomen with hepatic steatosis, heterogeneous liver with portal hypertension changes including hypertrophied caudate lobe, varices, and small volume ascites. Iron sat 31%, ferritin 661.    He reports history as above. Denies any knowledge of liver disease though reports he has had mildly abnormal LFTs in the past and had been told it was related to fatty liver. Only prior LFT in system 12/2019 Tb 1.8, , .  Denies any history of hospitalization prior to this. Endorses  15-20lb weight loss over past 4-6 months but denies ever being significantly overweight (heaviest 190lb)    Social History  - Herbal / OTC / Online medications: Denies  - APAP: Denies  - ETOH: endorses only occasional alcohol on weekend, no daily drinking  - Prior ETOH-related hospitalizations: Denies  - Incarcerations/Law issues due to ETOH: Denies  - ETOH Rehab/AA: Denies  - Drug use: Denies  - Smoking: Never smoker  - Occupation: oncologist at [employer]  - Live with: girlfriend   - Family: no children  - Animals: none, grew up on a farm (cattle, produce and chickens)  - no  time, no travel  - Functional status: Fully autonomous prior to admission    Past Surgical History  - No history of abdominal surgeries    Family History  - family members with fatty liver        Past Medical History:   Diagnosis Date    Elevated LFTs     HTN (hypertension)     Hyperlipidemia        Past Surgical History:   Procedure Laterality Date    TONSILLECTOMY         Review of patient's allergies indicates:  No Known Allergies  Family History     Problem Relation (Age of Onset)    Cancer Paternal Grandmother    Congenital heart disease Brother    Coronary artery disease Maternal Grandfather, Paternal Grandfather    Hypertension Mother, Father    No Known Problems Sister, Brother        Tobacco Use    Smoking status: Never Smoker    Smokeless tobacco: Never Used   Substance and Sexual Activity    Alcohol use: Yes     Comment: socially    Drug use: Never    Sexual activity: Yes     Partners: Female     Review of Systems   Constitutional: Negative for activity change, appetite change, chills, diaphoresis, fatigue, fever and unexpected weight change.   HENT: Negative for sore throat and trouble swallowing.    Eyes: Negative for visual disturbance.   Respiratory: Negative for chest tightness and shortness of breath.    Cardiovascular: Negative for chest pain and leg swelling.   Gastrointestinal: Positive for blood in stool.  Negative for abdominal distention, abdominal pain, anal bleeding, constipation, diarrhea, nausea and vomiting.   Genitourinary: Negative for dysuria and hematuria.   Musculoskeletal: Negative for arthralgias and myalgias.   Skin: Negative for rash.   Neurological: Negative for dizziness, weakness, light-headedness and headaches.   Psychiatric/Behavioral: Negative for agitation and confusion.     Objective:     Vital Signs (Most Recent):  Temp: 98.2 °F (36.8 °C) (09/17/20 1331)  Pulse: 85 (09/17/20 1331)  Resp: 14 (09/17/20 1331)  BP: 113/75 (09/17/20 1331)  SpO2: 98 % (09/17/20 1331) Vital Signs (24h Range):  Temp:  [97.6 °F (36.4 °C)-98.2 °F (36.8 °C)] 98.2 °F (36.8 °C)  Pulse:  [64-85] 85  Resp:  [14-20] 14  SpO2:  [95 %-100 %] 98 %  BP: (113-146)/(68-92) 113/75     Weight: 77.6 kg (171 lb 1.2 oz) (09/16/20 1243)  Body mass index is 24.55 kg/m².      Intake/Output Summary (Last 24 hours) at 9/17/2020 1340  Last data filed at 9/17/2020 1310  Gross per 24 hour   Intake 2010 ml   Output 910 ml   Net 1100 ml       Lines/Drains/Airways     Peripheral Intravenous Line                 Peripheral IV - Single Lumen 09/16/20 1230 20 G Left Forearm 1 day                Physical Exam  Vitals signs and nursing note reviewed.   Constitutional:       General: He is not in acute distress.     Appearance: He is not diaphoretic.      Comments: Well appearing, pleasant, no distress   HENT:      Head: Normocephalic and atraumatic.      Mouth/Throat:      Pharynx: No oropharyngeal exudate.   Eyes:      General: Scleral icterus present.      Conjunctiva/sclera: Conjunctivae normal.   Neck:      Vascular: No JVD.   Cardiovascular:      Rate and Rhythm: Normal rate and regular rhythm.      Heart sounds: Normal heart sounds.   Pulmonary:      Effort: Pulmonary effort is normal. No respiratory distress.      Breath sounds: Normal breath sounds.   Abdominal:      General: Bowel sounds are normal. There is no distension.      Palpations:  Abdomen is soft. There is no mass.      Tenderness: There is no abdominal tenderness. There is no guarding or rebound.      Comments: Soft, non-tender. No ascites.   Musculoskeletal:         General: No tenderness.   Lymphadenopathy:      Cervical: No cervical adenopathy.   Skin:     General: Skin is warm.      Capillary Refill: Capillary refill takes less than 2 seconds.   Neurological:      Mental Status: He is alert and oriented to person, place, and time.      Comments: No asterixis.   Psychiatric:         Behavior: Behavior normal.         Thought Content: Thought content normal.         Judgment: Judgment normal.         Significant Labs:  All pertinent lab results from the last 24 hours have been reviewed.    Significant Imaging:  Imaging results within the past 24 hours have been reviewed.    Assessment/Plan:     Cirrhosis of liver without ascites  Mr Ragsdale is a 40 year old man with unremarkable medical history who is presenting to hospital with concern for BRBPR and hepatic mass; hepatology consulted due to concern for hepatic mass.    Further imaging without evidence of hepatic mass but concerning for ESLD and changes of portal hypertension. Mass noted on prior imaging was hypertrophied caudate lobe. Etiology of liver disease is unclear at this time. DAVENPORT is also on differential though normal BMI.  Given pattern of enzymes concern for etoh related though denies significant etoh history. Caudate lobe hypertrophy can be seen in setting of budd chiari (will ask radiology to clarify as hep vein not discussed on imaging). Hemochromatosis is on differential. Viral studies negative.     Plan  - will check additional CLD labs  - recommend liver biopsy  - no ascites for paracentesis  - infectious workup  - elevated iron studies, will check HFE genotype  - mental status unremarkable, does not need lactulose  - echo  - pending EGD/Colon with GI for BRBPR, input appreciated  - variceal status: pending EGD  - HCC  screening. AFP negative, MRI negative  - transplant. Low meld.          Thank you for your consult. I will follow-up with patient. Please contact us if you have any additional questions.    Jv Garces MD  Gastroenterology  [facility]

## 2020-09-17 NOTE — PLAN OF CARE
Patient involved with plan of care and communicated needs throughout shift. Significant other to remain at the bedside overnight. AAOx4. VSS. C/o nausea and vomiting addressed with PRN Zofran x2. C/o pain addressed with PRN Morphine x3. Patient voids in urinal and ambulates independently but instructed to call for additional assistance. Nonskid socks in use, bed locked in lowest position, and pt belongings as well as call light within reach. Will continue to monitor with hourly rounds and clustered care to promote rest.

## 2020-09-17 NOTE — TRANSFER OF CARE
"Anesthesia Transfer of Care Note    Patient: Сергей Ragsdale    Procedure(s) Performed: Procedure(s) (LRB):  EGD (ESOPHAGOGASTRODUODENOSCOPY) (N/A)  COLONOSCOPY (N/A)    Patient location: PACU    Anesthesia Type: general    Transport from OR: Transported from OR on 2-3 L/min O2 by NC with adequate spontaneous ventilation    Post pain: adequate analgesia    Post assessment: no apparent anesthetic complications and tolerated procedure well    Post vital signs: stable    Level of consciousness: awake, alert and oriented    Nausea/Vomiting: no nausea/vomiting    Complications: none    Transfer of care protocol was followed      Last vitals:   Visit Vitals  /75 (BP Location: Right arm, Patient Position: Lying)   Pulse 85   Temp 36.8 °C (98.2 °F) (Temporal)   Resp 14   Ht 5' 10" (1.778 m)   Wt 77.6 kg (171 lb 1.2 oz)   SpO2 98%   BMI 24.55 kg/m²     "

## 2020-09-17 NOTE — SUBJECTIVE & OBJECTIVE
Past Medical History:   Diagnosis Date    Elevated LFTs     HTN (hypertension)     Hyperlipidemia        Past Surgical History:   Procedure Laterality Date    TONSILLECTOMY         Review of patient's allergies indicates:  No Known Allergies  Family History     Problem Relation (Age of Onset)    Cancer Paternal Grandmother    Congenital heart disease Brother    Coronary artery disease Maternal Grandfather, Paternal Grandfather    Hypertension Mother, Father    No Known Problems Sister, Brother        Tobacco Use    Smoking status: Never Smoker    Smokeless tobacco: Never Used   Substance and Sexual Activity    Alcohol use: Yes     Comment: socially    Drug use: Never    Sexual activity: Yes     Partners: Female     Review of Systems   Constitutional: Negative for activity change, appetite change, chills, diaphoresis, fatigue, fever and unexpected weight change.   HENT: Negative for sore throat and trouble swallowing.    Eyes: Negative for visual disturbance.   Respiratory: Negative for chest tightness and shortness of breath.    Cardiovascular: Negative for chest pain and leg swelling.   Gastrointestinal: Positive for blood in stool. Negative for abdominal distention, abdominal pain, anal bleeding, constipation, diarrhea, nausea and vomiting.   Genitourinary: Negative for dysuria and hematuria.   Musculoskeletal: Negative for arthralgias and myalgias.   Skin: Negative for rash.   Neurological: Negative for dizziness, weakness, light-headedness and headaches.   Psychiatric/Behavioral: Negative for agitation and confusion.     Objective:     Vital Signs (Most Recent):  Temp: 98.2 °F (36.8 °C) (09/17/20 1331)  Pulse: 85 (09/17/20 1331)  Resp: 14 (09/17/20 1331)  BP: 113/75 (09/17/20 1331)  SpO2: 98 % (09/17/20 1331) Vital Signs (24h Range):  Temp:  [97.6 °F (36.4 °C)-98.2 °F (36.8 °C)] 98.2 °F (36.8 °C)  Pulse:  [64-85] 85  Resp:  [14-20] 14  SpO2:  [95 %-100 %] 98 %  BP: (113-146)/(68-92) 113/75     Weight:  77.6 kg (171 lb 1.2 oz) (09/16/20 1243)  Body mass index is 24.55 kg/m².      Intake/Output Summary (Last 24 hours) at 9/17/2020 1340  Last data filed at 9/17/2020 1310  Gross per 24 hour   Intake 2010 ml   Output 910 ml   Net 1100 ml       Lines/Drains/Airways     Peripheral Intravenous Line                 Peripheral IV - Single Lumen 09/16/20 1230 20 G Left Forearm 1 day                Physical Exam  Vitals signs and nursing note reviewed.   Constitutional:       General: He is not in acute distress.     Appearance: He is not diaphoretic.      Comments: Well appearing, pleasant, no distress   HENT:      Head: Normocephalic and atraumatic.      Mouth/Throat:      Pharynx: No oropharyngeal exudate.   Eyes:      General: Scleral icterus present.      Conjunctiva/sclera: Conjunctivae normal.   Neck:      Vascular: No JVD.   Cardiovascular:      Rate and Rhythm: Normal rate and regular rhythm.      Heart sounds: Normal heart sounds.   Pulmonary:      Effort: Pulmonary effort is normal. No respiratory distress.      Breath sounds: Normal breath sounds.   Abdominal:      General: Bowel sounds are normal. There is no distension.      Palpations: Abdomen is soft. There is no mass.      Tenderness: There is no abdominal tenderness. There is no guarding or rebound.      Comments: Soft, non-tender. No ascites.   Musculoskeletal:         General: No tenderness.   Lymphadenopathy:      Cervical: No cervical adenopathy.   Skin:     General: Skin is warm.      Capillary Refill: Capillary refill takes less than 2 seconds.   Neurological:      Mental Status: He is alert and oriented to person, place, and time.      Comments: No asterixis.   Psychiatric:         Behavior: Behavior normal.         Thought Content: Thought content normal.         Judgment: Judgment normal.         Significant Labs:  All pertinent lab results from the last 24 hours have been reviewed.    Significant Imaging:  Imaging results within the past 24 hours  have been reviewed.

## 2020-09-17 NOTE — DISCHARGE INSTRUCTIONS

## 2020-09-17 NOTE — TREATMENT PLAN
[facility]  Gastroenterology Treatment Plan        EGD:   Impression:           - Small (< 5 mm) esophageal varices.                         - Portal hypertensive gastropathy.                         - Duodenal erosions without bleeding. Biopsied.     Colonoscopy:  Impression:           - Hemorrhoids found on perianal exam.                         - The examined portion of the ileum was normal.                         - Three bleeding colonic angiodysplastic lesions.                         Treated with argon plasma coagulation (APC).                         - The examination was otherwise normal on direct                         and retroflexion views.                         - No specimens collected.   Recommendation:       - Return patient to hospital [facility] for ongoing care.                         - Repeat colonoscopy in 10 years for screening                         purposes.                         - The findings and recommendations were discussed                         with the designated responsible adult.         Thank you for involving us in the care of Сергей Ragsdale. Please call with any additional questions, concerns or changes in the patient's clinical status.    Kavon Wilson MD  Gastroenterology Fellow PGY IV   [facility]

## 2020-09-17 NOTE — PROVATION PATIENT INSTRUCTIONS
Discharge Summary/Instructions after an Endoscopic Procedure  Patient Name: Сергей Ragsdale  Patient MRN: [id number]  Patient YOB: 1980 Thursday, September 17, 2020  Ryan Bang MD  RESTRICTIONS:  During your procedure today, you received medications for sedation.  These   medications may affect your judgment, balance and coordination.  Therefore,   for 24 hours, you have the following restrictions:   - DO NOT drive a car, operate machinery, make legal/financial decisions,   sign important papers or drink alcohol.    ACTIVITY:  Today: no heavy lifting, straining or running due to procedural   sedation/anesthesia.  The following day: return to full activity including work.  DIET:  Eat and drink normally unless instructed otherwise.     TREATMENT FOR COMMON SIDE EFFECTS:  - Mild abdominal pain, nausea, belching, bloating or excessive gas:  rest,   eat lightly and use a heating pad.  - Sore Throat: treat with throat lozenges and/or gargle with warm salt   water.  - Because air was used during the procedure, expelling large amounts of air   from your rectum or belching is normal.  - If a bowel prep was taken, you may not have a bowel movement for 1-3 days.    This is normal.  SYMPTOMS TO WATCH FOR AND REPORT TO YOUR PHYSICIAN:  1. Abdominal pain or bloating, other than gas cramps.  2. Chest pain.  3. Back pain.  4. Signs of infection such as: chills or fever occurring within 24 hours   after the procedure.  5. Rectal bleeding, which would show as bright red, maroon, or black stools.   (A tablespoon of blood from the rectum is not serious, especially if   hemorrhoids are present.)  6. Vomiting.  7. Weakness or dizziness.  GO DIRECTLY TO THE NEAREST EMERGENCY ROOM IF YOU HAVE ANY OF THE FOLLOWING:      Difficulty breathing              Chills and/or fever over 101 F   Persistent vomiting and/or vomiting blood   Severe abdominal pain   Severe chest pain   Black, tarry stools   Bleeding- more than one  tablespoon   Any other symptom or condition that you feel may need urgent attention  Your doctor recommends these additional instructions:  If any biopsies were taken, your doctors clinic will contact you in 1 to 2   weeks with any results.  - Return patient to [facility] for ongoing care.   - Perform a colonoscopy today.   - The findings and recommendations were discussed with the designated   responsible adult.  For questions, problems or results please call your physician - Ryan Bang MD at Work:  [phone].  [facility], EMERGENCY ROOM PHONE NUMBER: [phone]  IF A COMPLICATION OR EMERGENCY SITUATION ARISES AND YOU ARE UNABLE TO REACH   YOUR PHYSICIAN - GO DIRECTLY TO THE EMERGENCY ROOM.  Ryan Bang MD  9/17/2020 12:45:46 PM  This report has been verified and signed electronically.  [employer]ATION

## 2020-09-17 NOTE — ASSESSMENT & PLAN NOTE
Mr Ragsdale is a 40 year old man with unremarkable medical history who is presenting to hospital with concern for BRBPR and hepatic mass; hepatology consulted due to concern for hepatic mass.    Further imaging without evidence of hepatic mass but concerning for ESLD and changes of portal hypertension. Mass noted on prior imaging was hypertrophied caudate lobe. Etiology of liver disease is unclear at this time. Given pattern of enzymes concern for etoh related though denies significant etoh history. Caudate lobe hypertrophy can be seen in setting of budd chiari (will ask radiology to clarify as hep vein not discussed on imaging). Hemochromatosis is on differential. DAVENPORT is also on differential though normal BMI. Viral studies negative.     Plan  - will check additional CLD labs  - no ascites for paracentesis  - infectious workup  - elevated iron studies, will check HFE genotype  - mental status unremarkable, does not need lactulose  - echo  - pending EGD/Colon with GI for BRBPR, input appreciated  - variceal status: pending EGD  - HCC screening. AFP negative, MRI negative  - transplant. Low meld.

## 2020-09-17 NOTE — HPI
Сергей Ragsdale, a 40 y.o. male, for therapy visit.  Met with patient.    Chief Complaint/Reason for Encounter: adaptation to disease and treatment, depression

## 2020-09-17 NOTE — PLAN OF CARE
Recommendations     1.) Advance diet as tolerated to low sodium once medically appropriate.   2.) Add Boost Breeze TID if pt has poor PO intake.     Goals: 1.) Pt to meet >75% of estimated energy and protein needs over the course of 7 days.  Nutrition Goal Status: new  Communication of RD Recs: (POC)

## 2020-09-17 NOTE — PLAN OF CARE
Pt independent of care. No acute event throughout shift. Pt to be NPO after midnight for procedure in AM. Pt reports pain to L foot; IV pain medication given, mildly resolved. Pt completed MRI. U/S still outstanding. Will continue to monitor    Problem: Adult Inpatient Plan of Care  Goal: Plan of Care Review  Outcome: Ongoing, Progressing     Problem: Adult Inpatient Plan of Care  Goal: Optimal Comfort and Wellbeing  Outcome: Ongoing, Progressing

## 2020-09-17 NOTE — PROGRESS NOTES
[facility]  Psychology  Progress Note  Individual Psychotherapy (PhD/LCSW)    Patient Name: Сергей Ragsdale  MRN: [id number]    Patient Class: IP- Inpatient  Admission Date: 9/16/2020  Hospital Length of Stay: 1 days  Attending Physician: Sandy Javier MD  Primary Care Provider: Roney Love MD    Therapeutic Intervention: Met with patient.  Inpatient/Partial Hospital - Behavior modifying psychotherapy 60 min - CPT code 26625    Chief Complaint/Reason for Encounter: depression and adaptation to disease and treatment     Interval History and Content of Current Session: Discussed coping and support needs during current illness. Discussed historical impacts on coping and barriers to effective care.     Documentation in this setting limited due to privacy concerns.    Risk Parameters:  Patient reports suicidal ideation: without intent or plan  Patient reports no homicidal ideation  Patient reports no self-injurious behavior  Patient reports no violent behavior    Verbal Deficits: None    Patient's response to intervention:  The patient's response to intervention is accepting, motivated.    Progress toward goals and other mental status changes:  The patient's progress toward goals is good.    Diagnostic Impression - Plan:     Severe episode of recurrent major depressive disorder, without psychotic features  Recurrent major depression, with prior suicidality  (potential historical manic episodes, not fully assessed)    Current severe major depression with suicidal ideation, without intent or plan.  Likely exacerbated by sleep deprivation due to pain.    Discussed coping resources and referral to mental health services.    Patient aware of how to reach me, directly, in case of crisis.           Treatment Plan:  · Target symptoms: depression, anxiety and sleep  · Why chosen therapy is appropriate versus another modality: relevant to diagnosis, patient responds to this modality, evidence based  practice  · Outcome monitoring methods: self-report, observation  · Therapeutic intervention type: behavior modifying psychotherapy, supportive psychotherapy    Plan:  individual psychotherapy and medication management by physician, likely future referral to psychiatrist for medication management    Return to Clinic: connection will be facilitated to psychiatry/psychology; pending discharge considerations    Length of Service (minutes): 60    Aleksandar Weber, PhD  Psychology  [facility]

## 2020-09-17 NOTE — SUBJECTIVE & OBJECTIVE
Therapeutic Intervention: Met with patient.  Inpatient/Partial Hospital - Behavior modifying psychotherapy 60 min - CPT code 23801    Chief Complaint/Reason for Encounter: depression and adaptation to disease and treatment     Interval History and Content of Current Session: Discussed coping and support needs during current illness. Discussed historical impacts on coping and barriers to effective care.     Documentation in this setting limited due to privacy concerns.    Risk Parameters:  Patient reports suicidal ideation: without intent or plan  Patient reports no homicidal ideation  Patient reports no self-injurious behavior  Patient reports no violent behavior    Verbal Deficits: None    Patient's response to intervention:  The patient's response to intervention is accepting, motivated.    Progress toward goals and other mental status changes:  The patient's progress toward goals is good.

## 2020-09-17 NOTE — CONSULTS
Inpatient Radiology Consult Note    History of Present Illness:  Сергей Ragsdale is a 40 y.o. male with unremarkable medical history who is presenting to hospital with concern for BRBPR.  Working up concerning for ESLD and portal hypertension.  IR consulted for liver biopsy.      Admission H&P reviewed.  Past Medical History:   Diagnosis Date    Elevated LFTs     HTN (hypertension)     Hyperlipidemia      Past Surgical History:   Procedure Laterality Date    TONSILLECTOMY         Review of Systems:   As documented in primary team H&P    Home Meds:   Prior to Admission medications    Medication Sig Start Date End Date Taking? Authorizing Provider   gabapentin (NEURONTIN) 300 MG capsule Take 2 capsules (600 mg total) by mouth every evening. 8/7/20 8/7/21  Alban Wilson MD   nebivoloL (BYSTOLIC) 10 MG Tab Take 1 tablet (10 mg total) by mouth once daily. 3/20/20   Roney Love MD     Scheduled Meds:    gabapentin  300 mg Oral BID    morphine  15 mg Oral QHS    nebivoloL  10 mg Oral Daily    nortriptyline  25 mg Oral QHS     Continuous Infusions:   PRN Meds:morphine, ondansetron, oxyCODONE, sodium chloride 0.9%  Anticoagulants/Antiplatelets: no anticoagulation    Allergies: Review of patient's allergies indicates:  No Known Allergies  Sedation Hx: have not been any systemic reactions    Labs:  Recent Labs   Lab 09/15/20  1248   INR 1.2       Recent Labs   Lab 09/17/20  0509   WBC 6.60   HGB 8.9*   HCT 28.4*   *   PLT 88*      Recent Labs   Lab 09/17/20  0509   GLU 94      K 3.3*      CO2 26   BUN 4*   CREATININE 0.7   CALCIUM 8.4*   ALT 36   *   ALBUMIN 2.8*   BILITOT 3.0*             Plan:   IR initially consulted for random liver biopsy of targeted area as shown above.  Hepatology then informed us they no longer wish to proceed with the biopsy at this time. Please re-consult IR again if biopsy is needed.     Deanne Holly MD  Department of Radiology,  PGY-3  Pager: [phone]

## 2020-09-17 NOTE — HPI
Mr Ragsdale is a 40 year old man with unremarkable medical history who is presenting to hospital with concern for BRBPR and hepatic mass; hepatology consulted due to concern for hepatic mass.    He presented to oncology on 9/16 due to concern for 3 month history of progressive symptoms. Initial symptoms included leg pain which has been unresolving despite therapy including podiatry/ortho, injections, steroids and gabapentin. Over the past 2 weeks has experienced rapid progression with pitting edema, ecchymosis, and brbpr and vomitus and 20lb weight loss. CTAP was concerning for caudate lobe mass (5.9 x 5.8cm with mass effect on IVC, mild peritoneal nodularity and multiple small cristiana hepatis LN. He was admitted for workup.    Labs notable for HepB immunity, HCV negative, anemia (hgb 9.7, baseline 14 12/2019), Tb 2.7, , ALT 40, . CEA 3.0, CA 19-9 38, AFP 6.7. MRI brain, spine negative. MRI triple phase abdomen with hepatic steatosis, heterogeneous liver with portal hypertension changes including hypertrophied caudate lobe, varices, and small volume ascites. Iron sat 31%, ferritin 661.    He reports history as above. Denies any knowledge of liver disease though reports he has had mildly abnormal LFTs in the past and had been told it was related to fatty liver. Only prior LFT in system 12/2019 Tb 1.8, , .  Denies any history of hospitalization prior to this. Endorses 15-20lb weight loss over past 4-6 months but denies ever being significantly overweight (heaviest 190lb)    Social History  - Herbal / OTC / Online medications: Denies  - APAP: Denies  - ETOH: endorses only occasional alcohol on weekend, no daily drinking  - Prior ETOH-related hospitalizations: Denies  - Incarcerations/Law issues due to ETOH: Denies  - ETOH Rehab/AA: Denies  - Drug use: Denies  - Smoking: Never smoker  - Occupation: oncologist at [employer]  - Live with: girlfriend   - Family: no children  - Animals: none, grew up  on a farm (cattle, produce and chickens)  - no  time, no travel  - Functional status: Fully autonomous prior to admission    Past Surgical History  - No history of abdominal surgeries    Family History  - family members with fatty liver

## 2020-09-17 NOTE — PROVATION PATIENT INSTRUCTIONS
Discharge Summary/Instructions after an Endoscopic Procedure  Patient Name: Сергей Ragsdale  Patient MRN: [id number]  Patient YOB: 1980 Thursday, September 17, 2020  Ryan Bang MD  RESTRICTIONS:  During your procedure today, you received medications for sedation.  These   medications may affect your judgment, balance and coordination.  Therefore,   for 24 hours, you have the following restrictions:   - DO NOT drive a car, operate machinery, make legal/financial decisions,   sign important papers or drink alcohol.    ACTIVITY:  Today: no heavy lifting, straining or running due to procedural   sedation/anesthesia.  The following day: return to full activity including work.  DIET:  Eat and drink normally unless instructed otherwise.     TREATMENT FOR COMMON SIDE EFFECTS:  - Mild abdominal pain, nausea, belching, bloating or excessive gas:  rest,   eat lightly and use a heating pad.  - Sore Throat: treat with throat lozenges and/or gargle with warm salt   water.  - Because air was used during the procedure, expelling large amounts of air   from your rectum or belching is normal.  - If a bowel prep was taken, you may not have a bowel movement for 1-3 days.    This is normal.  SYMPTOMS TO WATCH FOR AND REPORT TO YOUR PHYSICIAN:  1. Abdominal pain or bloating, other than gas cramps.  2. Chest pain.  3. Back pain.  4. Signs of infection such as: chills or fever occurring within 24 hours   after the procedure.  5. Rectal bleeding, which would show as bright red, maroon, or black stools.   (A tablespoon of blood from the rectum is not serious, especially if   hemorrhoids are present.)  6. Vomiting.  7. Weakness or dizziness.  GO DIRECTLY TO THE NEAREST EMERGENCY ROOM IF YOU HAVE ANY OF THE FOLLOWING:      Difficulty breathing              Chills and/or fever over 101 F   Persistent vomiting and/or vomiting blood   Severe abdominal pain   Severe chest pain   Black, tarry stools   Bleeding- more than one  tablespoon   Any other symptom or condition that you feel may need urgent attention  Your doctor recommends these additional instructions:  If any biopsies were taken, your doctors clinic will contact you in 1 to 2   weeks with any results.  - Return patient to [facility] for ongoing care.   - Repeat colonoscopy in 10 years for screening purposes.   - The findings and recommendations were discussed with the designated   responsible adult.   For questions, problems or results please call your physician - Ryan Bang MD at Work:  [phone].  [facility], EMERGENCY ROOM PHONE NUMBER: [phone]  IF A COMPLICATION OR EMERGENCY SITUATION ARISES AND YOU ARE UNABLE TO REACH   YOUR PHYSICIAN - GO DIRECTLY TO THE EMERGENCY ROOM.  Ryan Bang MD  9/17/2020 1:24:21 PM  This report has been verified and signed electronically.  [employer]ATION

## 2020-09-17 NOTE — CONSULTS
"  [facility]  Adult Nutrition  Consult Note    SUMMARY     Recommendations    1.) Advance diet as tolerated to low sodium once medically appropriate.   2.) Add Boost Breeze TID if pt has poor PO intake.    Goals: 1.) Pt to meet >75% of estimated energy and protein needs over the course of 7 days.  Nutrition Goal Status: new  Communication of RD Recs: (POC)    Reason for Assessment    Reason For Assessment: consult  Diagnosis: (Cirrhosis of liver without ascites)  Relevant Medical History: HTN, hyperlipidemia  Interdisciplinary Rounds: did not attend  General Information Comments: Pt not in room due to EGD and colonoscopy procedure. Per hospital records, pt had 10% weight loss over the course of 1 month (), pt has significant weight loss. RD will complete NFPE at follow up. GI- [facility] , nsg staff reports pt vomitted blood yesterday.  Nutrition Discharge Planning: Pt to follow high calorie and high protein diet at home.    Nutrition Risk Screen    Nutrition Risk Screen: no indicators present    Nutrition/Diet History    Spiritual, Cultural Beliefs, Anabaptist Practices, Values that Affect Care: no  Food Allergies: NKFA  Factors Affecting Nutritional Intake: nausea/vomiting, NPO    Anthropometrics    Temp: 98.2 °F (36.8 °C)  Height Method: Measured  Height: 5' 10" (177.8 cm)  Height (inches): 70 in  Weight Method: Standard Scale  Weight: 77.6 kg (171 lb 1.2 oz)  Weight (lb): 171.08 lb  Ideal Body Weight (IBW), Male: 166 lb  % Ideal Body Weight, Male (lb): 103.06 %  BMI (Calculated): 24.5  BMI Grade: 18.5-24.9 - normal  Usual Body Weight (UBW), k kg()  Weight Change Amount: 20 lb 11.6 oz(10% over 1 month)  % Usual Body Weight: 89.38       Lab/Procedures/Meds    Pertinent Labs Reviewed: reviewed  Pertinent Labs Comments: K 3.3, BUN 4, Ca 8.4, Alb 2.8  Pertinent Medications Reviewed: reviewed  Pertinent Medications Comments: Morphine    Physical Findings/Assessment     Edema 2+ " leg    Estimated/Assessed Needs    Weight Used For Calorie Calculations: 77 kg (169 lb 12.1 oz)  Energy Calorie Requirements (kcal): 2310-2695kcal  Energy Need Method: Kcal/kg(30-35kcal/kg)  Protein Requirements:  gm (1.2-1.4gm/kg)  Weight Used For Protein Calculations: 77 kg (169 lb 12.1 oz)  Fluid Requirements (mL): 1mL/kcal or per MD recommendations  Estimated Fluid Requirement Method: RDA Method  RDA Method (mL): 2310         Nutrition Prescription Ordered    Current Diet Order: NPO (9/17)    Evaluation of Received Nutrient/Fluid Intake    I/O: I: 1058; O: 300; +1.1L since admission  Energy Calories Required: not meeting needs  Protein Required: not meeting needs  Fluid Required: not meeting needs  Tolerance: not tolerating  % Intake of Estimated Energy Needs: 0  % Meal Intake: 0    Nutrition Risk    Level of Risk/Frequency of Follow-up: high , 2x weekly    Assessment and Plan        Nutrition Problem  Unintentional weight loss    Related to (etiology):   Chronic illness    Signs and Symptoms (as evidenced by):   10% weight loss over the course of 1 month per hospital records.     Interventions(treatment strategy):  1.) Collaboration with other providers    Nutrition Diagnosis Status:   new     Monitor and Evaluation    Food and Nutrient Intake: energy intake, food and beverage intake  Food and Nutrient Adminstration: diet order  Knowledge/Beliefs/Attitudes: food and nutrition knowledge/skill  Anthropometric Measurements: weight, weight change  Biochemical Data, Medical Tests and Procedures: electrolyte and renal panel, gastrointestinal profile  Nutrition-Focused Physical Findings: skin, overall appearance       Nutrition Follow-Up    RD Follow-up?: Yes

## 2020-09-17 NOTE — PROGRESS NOTES
Psychological assessment document stored in alternate location for reasons of confidentiality.   MARILY Weber, PhD

## 2020-09-17 NOTE — ASSESSMENT & PLAN NOTE
Current severe major depression with suicidal ideation, without intent or plan.  Likely exacerbated by sleep deprivation due to pain.    Discussed coping resources and referral to mental health services.    Patient aware of how to reach me, directly, in case of crisis.

## 2020-09-18 ENCOUNTER — TELEPHONE (OUTPATIENT)
Dept: HEPATOLOGY | Facility: CLINIC | Age: 40
End: 2020-09-18

## 2020-09-18 VITALS
BODY MASS INDEX: 24.48 KG/M2 | HEIGHT: 70 IN | SYSTOLIC BLOOD PRESSURE: 148 MMHG | RESPIRATION RATE: 16 BRPM | OXYGEN SATURATION: 99 % | DIASTOLIC BLOOD PRESSURE: 92 MMHG | TEMPERATURE: 99 F | WEIGHT: 171 LBS | HEART RATE: 82 BPM

## 2020-09-18 DIAGNOSIS — K74.60 CIRRHOSIS OF LIVER WITHOUT ASCITES, UNSPECIFIED HEPATIC CIRRHOSIS TYPE: Primary | ICD-10-CM

## 2020-09-18 DIAGNOSIS — R79.89 ELEVATED LFTS: Primary | ICD-10-CM

## 2020-09-18 PROBLEM — F32.A DEPRESSION: Chronic | Status: ACTIVE | Noted: 2020-09-18

## 2020-09-18 PROBLEM — G47.00 INSOMNIA: Status: ACTIVE | Noted: 2020-09-18

## 2020-09-18 LAB
A1AT SERPL-MCNC: 230 MG/DL (ref 100–190)
ALBUMIN SERPL BCP-MCNC: 2.7 G/DL (ref 3.5–5.2)
ALP SERPL-CCNC: 174 U/L (ref 55–135)
ALT SERPL W/O P-5'-P-CCNC: 31 U/L (ref 10–44)
ANA SER QL IF: NORMAL
ANION GAP SERPL CALC-SCNC: 7 MMOL/L (ref 8–16)
ASCENDING AORTA: 3.24 CM
AST SERPL-CCNC: 122 U/L (ref 10–40)
AV INDEX (PROSTH): 0.88
AV MEAN GRADIENT: 5 MMHG
AV PEAK GRADIENT: 8 MMHG
AV VALVE AREA: 3.4 CM2
AV VELOCITY RATIO: 0.94
BASOPHILS # BLD AUTO: 0.04 K/UL (ref 0–0.2)
BASOPHILS NFR BLD: 0.7 % (ref 0–1.9)
BILIRUB SERPL-MCNC: 3.2 MG/DL (ref 0.1–1)
BSA FOR ECHO PROCEDURE: 1.96 M2
BUN SERPL-MCNC: 5 MG/DL (ref 6–20)
CALCIUM SERPL-MCNC: 7.8 MG/DL (ref 8.7–10.5)
CERULOPLASMIN SERPL-MCNC: 34 MG/DL (ref 15–45)
CHLORIDE SERPL-SCNC: 101 MMOL/L (ref 95–110)
CO2 SERPL-SCNC: 29 MMOL/L (ref 23–29)
CREAT SERPL-MCNC: 0.7 MG/DL (ref 0.5–1.4)
CV ECHO LV RWT: 0.29 CM
DIFFERENTIAL METHOD: ABNORMAL
DOP CALC AO PEAK VEL: 1.43 M/S
DOP CALC AO VTI: 33.73 CM
DOP CALC LVOT AREA: 3.9 CM2
DOP CALC LVOT DIAMETER: 2.22 CM
DOP CALC LVOT PEAK VEL: 1.34 M/S
DOP CALC LVOT STROKE VOLUME: 114.71 CM3
DOP CALCLVOT PEAK VEL VTI: 29.65 CM
E WAVE DECELERATION TIME: 234.35 MSEC
E/A RATIO: 2.17
E/E' RATIO: 9.42 M/S
ECHO LV POSTERIOR WALL: 0.76 CM (ref 0.6–1.1)
EOSINOPHIL # BLD AUTO: 0.1 K/UL (ref 0–0.5)
EOSINOPHIL NFR BLD: 1.7 % (ref 0–8)
ERYTHROCYTE [DISTWIDTH] IN BLOOD BY AUTOMATED COUNT: 15.2 % (ref 11.5–14.5)
EST. GFR  (AFRICAN AMERICAN): >60 ML/MIN/1.73 M^2
EST. GFR  (NON AFRICAN AMERICAN): >60 ML/MIN/1.73 M^2
FRACTIONAL SHORTENING: 36 % (ref 28–44)
GLUCOSE SERPL-MCNC: 97 MG/DL (ref 70–110)
HCT VFR BLD AUTO: 27.1 % (ref 40–54)
HGB BLD-MCNC: 8.8 G/DL (ref 14–18)
IGG SERPL-MCNC: 1379 MG/DL (ref 650–1600)
IMM GRANULOCYTES # BLD AUTO: 0.04 K/UL (ref 0–0.04)
IMM GRANULOCYTES NFR BLD AUTO: 0.7 % (ref 0–0.5)
INTERVENTRICULAR SEPTUM: 0.69 CM (ref 0.6–1.1)
IVRT: 71.36 MSEC
LA MAJOR: 5.49 CM
LA MINOR: 5.51 CM
LA WIDTH: 4.12 CM
LEFT ATRIUM SIZE: 4.07 CM
LEFT ATRIUM VOLUME INDEX: 40.1 ML/M2
LEFT ATRIUM VOLUME: 78.39 CM3
LEFT INTERNAL DIMENSION IN SYSTOLE: 3.32 CM (ref 2.1–4)
LEFT VENTRICLE DIASTOLIC VOLUME INDEX: 65.91 ML/M2
LEFT VENTRICLE DIASTOLIC VOLUME: 128.71 ML
LEFT VENTRICLE MASS INDEX: 65 G/M2
LEFT VENTRICLE SYSTOLIC VOLUME INDEX: 22.9 ML/M2
LEFT VENTRICLE SYSTOLIC VOLUME: 44.72 ML
LEFT VENTRICULAR INTERNAL DIMENSION IN DIASTOLE: 5.19 CM (ref 3.5–6)
LEFT VENTRICULAR MASS: 127.86 G
LV LATERAL E/E' RATIO: 9.42 M/S
LV SEPTAL E/E' RATIO: 9.42 M/S
LYMPHOCYTES # BLD AUTO: 1.2 K/UL (ref 1–4.8)
LYMPHOCYTES NFR BLD: 21.1 % (ref 18–48)
MCH RBC QN AUTO: 37.8 PG (ref 27–31)
MCHC RBC AUTO-ENTMCNC: 32.5 G/DL (ref 32–36)
MCV RBC AUTO: 116 FL (ref 82–98)
MONOCYTES # BLD AUTO: 0.8 K/UL (ref 0.3–1)
MONOCYTES NFR BLD: 13.4 % (ref 4–15)
MV PEAK A VEL: 0.52 M/S
MV PEAK E VEL: 1.13 M/S
MV STENOSIS PRESSURE HALF TIME: 67.96 MS
MV VALVE AREA P 1/2 METHOD: 3.24 CM2
NEUTROPHILS # BLD AUTO: 3.7 K/UL (ref 1.8–7.7)
NEUTROPHILS NFR BLD: 62.4 % (ref 38–73)
NRBC BLD-RTO: 0 /100 WBC
PISA TR MAX VEL: 2.3 M/S
PLATELET # BLD AUTO: 72 K/UL (ref 150–350)
PMV BLD AUTO: 9.9 FL (ref 9.2–12.9)
POTASSIUM SERPL-SCNC: 3.6 MMOL/L (ref 3.5–5.1)
PROT SERPL-MCNC: 6.9 G/DL (ref 6–8.4)
PULM VEIN S/D RATIO: 1.58
PV PEAK D VEL: 0.5 M/S
PV PEAK S VEL: 0.79 M/S
RA MAJOR: 4.84 CM
RA PRESSURE: 3 MMHG
RA WIDTH: 3.3 CM
RBC # BLD AUTO: 2.33 M/UL (ref 4.6–6.2)
RIGHT VENTRICULAR END-DIASTOLIC DIMENSION: 2.92 CM
RV TISSUE DOPPLER FREE WALL SYSTOLIC VELOCITY 1 (APICAL 4 CHAMBER VIEW): 13.46 CM/S
SINUS: 3.45 CM
SODIUM SERPL-SCNC: 137 MMOL/L (ref 136–145)
STJ: 2.87 CM
TDI LATERAL: 0.12 M/S
TDI SEPTAL: 0.12 M/S
TDI: 0.12 M/S
TR MAX PG: 21 MMHG
TRICUSPID ANNULAR PLANE SYSTOLIC EXCURSION: 2.24 CM
TV REST PULMONARY ARTERY PRESSURE: 24 MMHG
WBC # BLD AUTO: 5.84 K/UL (ref 3.9–12.7)

## 2020-09-18 PROCEDURE — 82784 ASSAY IGA/IGD/IGG/IGM EACH: CPT

## 2020-09-18 PROCEDURE — 86038 ANTINUCLEAR ANTIBODIES: CPT

## 2020-09-18 PROCEDURE — 80321 ALCOHOLS BIOMARKERS 1OR 2: CPT

## 2020-09-18 PROCEDURE — 82390 ASSAY OF CERULOPLASMIN: CPT

## 2020-09-18 PROCEDURE — 36415 COLL VENOUS BLD VENIPUNCTURE: CPT

## 2020-09-18 PROCEDURE — 99223 1ST HOSP IP/OBS HIGH 75: CPT | Mod: ,,, | Performed by: PSYCHIATRY & NEUROLOGY

## 2020-09-18 PROCEDURE — 81256 HFE GENE: CPT

## 2020-09-18 PROCEDURE — 99223 PR INITIAL HOSPITAL CARE,LEVL III: ICD-10-PCS | Mod: ,,, | Performed by: PSYCHIATRY & NEUROLOGY

## 2020-09-18 PROCEDURE — 85025 COMPLETE CBC W/AUTO DIFF WBC: CPT

## 2020-09-18 PROCEDURE — 80053 COMPREHEN METABOLIC PANEL: CPT

## 2020-09-18 PROCEDURE — 82103 ALPHA-1-ANTITRYPSIN TOTAL: CPT

## 2020-09-18 PROCEDURE — 25000003 PHARM REV CODE 250: Performed by: EMERGENCY MEDICINE

## 2020-09-18 PROCEDURE — 86256 FLUORESCENT ANTIBODY TITER: CPT

## 2020-09-18 PROCEDURE — 25000003 PHARM REV CODE 250: Performed by: INTERNAL MEDICINE

## 2020-09-18 RX ORDER — MORPHINE SULFATE 15 MG/1
15 TABLET, FILM COATED, EXTENDED RELEASE ORAL NIGHTLY
Qty: 3 TABLET | Refills: 0 | Status: SHIPPED | OUTPATIENT
Start: 2020-09-18

## 2020-09-18 RX ORDER — GABAPENTIN 300 MG/1
600 CAPSULE ORAL NIGHTLY
Qty: 60 CAPSULE | Refills: 5 | Status: SHIPPED | OUTPATIENT
Start: 2020-09-18 | End: 2021-09-18

## 2020-09-18 RX ORDER — NORTRIPTYLINE HYDROCHLORIDE 25 MG/1
25 CAPSULE ORAL NIGHTLY
Qty: 30 CAPSULE | Refills: 3 | Status: SHIPPED | OUTPATIENT
Start: 2020-09-18 | End: 2021-01-16

## 2020-09-18 RX ADMIN — NEBIVOLOL HYDROCHLORIDE 10 MG: 5 TABLET ORAL at 09:09

## 2020-09-18 RX ADMIN — GABAPENTIN 300 MG: 300 CAPSULE ORAL at 09:09

## 2020-09-18 NOTE — CONSULTS
[facility]  Palliative Medicine  Consult Note    Patient Name: Сергей Ragsdale  MRN: [id number]  Admission Date: 9/16/2020  Hospital Length of Stay: 2 days  Code Status: Full Code   Attending Provider: Sandy Javier MD  Consulting Provider: Tyson Melgar MD  Primary Care Physician: Roney Love MD  Principal Problem:<principal problem not specified>    Patient information was obtained from patient and past medical records.      Consults  Assessment/Plan:     Insomnia  Trial of nortriptyline 25 mg as adjunct for foot pain and insomnia    Palliative care encounter  39 yo male with no sig past med hx other than mood disorder/depression admitted for further work up of hepatic mass, rectal bleeding, foot pain, and insomnia    1) Symptoms:  Left foot arthralgia: unknown etiology; has had an extensive outpt work up; discussed with primary team ? 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  Including consultants as needed.    Discussed with primary team.           Thank you for your consult. I will follow-up with patient. Please contact us if you have any additional questions.    Subjective:     HPI:   40 y.o.male admitted for workup for a liver mass diagnosed by CT scan. He has had 3 months of worsening left foot pain. This pain has progressed to also involve his lower leg. He has been evaluated by a podiatrist and orthopedist, but after injections, steroids and  gabapentin, this pain has not improved.  MRI of leg and foot was unrevealing. At the time of admission, patient was taking ibuprofen 20-30 per day for 2-3 weeks. He has also had worsening pitting edema LE, extensive ecchymosis, and hematochezia. He has also had unintentional 20 pound weight loss. Doesn't sleep more than 1-2 hours a night due to pain and insomnia.  Admits to overwhleming stress due to job duties and recent lifestyle changes.    Concern for liver mass noted on recent CT scan.  Pt admitted for further workup and requesting palliative medicine involvement    Hospital Course:  No notes on file    Interval History: discussed with primary team    Past Medical History:   Diagnosis Date    Elevated LFTs     HTN (hypertension)     Hyperlipidemia        Past Surgical History:   Procedure Laterality Date    TONSILLECTOMY         Review of patient's allergies indicates:  No Known Allergies    Medications:  Continuous Infusions:  Scheduled Meds:   gabapentin  600 mg Oral TID    polyethylene glycol  4,000 mL Oral Once     PRN Meds:morphine, oxyCODONE, sodium chloride 0.9%    Family History     Problem Relation (Age of Onset)    Cancer Paternal Grandmother    Congenital heart disease Brother    Coronary artery disease Maternal Grandfather, Paternal Grandfather    Hypertension Mother, Father    No Known Problems Sister, Brother        Tobacco Use    Smoking status: Never Smoker    Smokeless tobacco: Never Used   Substance and Sexual Activity    Alcohol use: Yes     Comment: socially    Drug use: Never    Sexual activity: Yes     Partners: Female       Review of Systems   Constitutional: Positive for activity change, fatigue and unexpected weight change.   HENT: Negative.    Eyes: Negative.    Respiratory: Negative.    Cardiovascular: Negative.    Gastrointestinal: Positive for blood in stool. Negative for rectal pain and vomiting.   Endocrine: Negative.    Genitourinary: Negative.    Musculoskeletal:         Left foot pain   Skin: Positive for color change. Negative for pallor.   Allergic/Immunologic: Negative.    Neurological: Negative.    Hematological: Bruises/bleeds easily.   Psychiatric/Behavioral: Positive for dysphoric mood. The patient is nervous/anxious.         History of depression    All other systems reviewed and are negative.       Objective:     Vital Signs (Most Recent):  Temp: 98.3 °F (36.8 °C) (09/16/20 1249)  Pulse: 62 (09/16/20 1249)  Resp: 20 (09/16/20 1344)  BP: 123/81 (09/16/20 1249)  SpO2: 98 % (09/16/20 1249) Vital Signs (24h Range):  Temp:  [98.3 °F (36.8 °C)] 98.3 °F (36.8 °C)  Pulse:  [62] 62  Resp:  [18-20] 20  SpO2:  [98 %] 98 %  BP: (123)/(81) 123/81     Weight: 77.6 kg (171 lb 1.2 oz)  Body mass index is 24.55 kg/m².    Physical Exam  Vitals signs and nursing note reviewed.     Constitutional:       General: He is not in acute distress.     Appearance: He is well-developed. He is not diaphoretic.      Comments: Well dress gentleman with jaundiced eyes   HENT:      Head: Normocephalic and atraumatic.      Mouth/Throat:      Pharynx: No oropharyngeal exudate.   Eyes:      General: Scleral icterus present.      Conjunctiva/sclera: Conjunctivae normal.      Pupils: Pupils are equal, round, and reactive to light.   Neck:      Musculoskeletal: Normal range of motion and neck supple.      Thyroid: No thyromegaly.      Vascular: No JVD.      Trachea: No tracheal deviation.   Cardiovascular:      Rate and Rhythm: Normal rate and regular rhythm.      Heart sounds: Normal heart sounds. No murmur. No friction rub.   Pulmonary:      Effort: Pulmonary effort is normal. No respiratory distress.      Breath sounds: Normal breath sounds. No stridor. No wheezing or rales.   Chest:      Chest wall: No tenderness.   Abdominal:      General: Bowel sounds are normal. There is no distension.      Palpations: Abdomen is soft.      Tenderness: There is no abdominal tenderness. There is no guarding or rebound.    Musculoskeletal: Normal range of motion.         General: No tenderness or deformity.   Skin:     General: Skin is warm and dry.      Capillary Refill: Capillary refill takes less than 2 seconds.      Coloration: Skin is not pale.      Findings: Bruising (on all extremities) present. No erythema or rash.   Neurological:      Mental Status: He is alert and oriented to person, place, and time.      Cranial Nerves: No cranial nerve deficit.      Sensory: No sensory deficit.      Motor: No abnormal muscle tone.      Coordination: Coordination normal.      Gait: Gait abnormal.      Deep Tendon Reflexes: Reflexes normal.   Psychiatric:         Behavior: Behavior normal.         Thought Content: Thought content normal.         Judgment: Judgment normal.   Review of Symptoms    Symptom Assessment (ESAS 0-10 Scale)  Pain:  6  Dyspnea:  0  Anxiety:  3  Nausea:  0  Depression:  7  Anorexia:  5  Fatigue:  5  Insomnia:  6  Restlessness:  0  Agitation:  0     CAM / Delirium:  Negative  Constipation:  Negative  Diarrhea:  Negative          Performance Status:  90    ECOG Performance Status Grade:  1 - Ambulates, capable of light work    Living Arrangements:  Lives alone    Psychosocial/Cultural:  from Cecily Ragsdale after 12 yr marriage; no kids; recently moved to [facility] for new job and socially isolated since COVID;     Spiritual:  F - Sunshine and Belief:  Did not discuss; grew up with [facility]      Advance Care Planning   Advance Directives:   Living Will: Yes        Copy on chart: No    [place]POST: No    Do Not Resuscitate Status: No    Medical Power of : No    Agent's Name:  Cecily Ragsdale Other [phone]     only call in case of emergency     Decision Making:  Patient answered questions         Significant Labs: All pertinent labs within the past 24 hours have been reviewed.  CBC:   Recent Labs   Lab 09/15/20  1248   WBC 7.29   HGB 9.7*   HCT 29.3*   *   *     BMP:  No results  for input(s): GLU, NA, K, CL, CO2, BUN, CREATININE, CALCIUM, MG in the last 24 hours.  LFT:  Lab Results   Component Value Date     (H) 09/15/2020    ALKPHOS 214 (H) 09/15/2020    BILITOT 2.7 (H) 09/15/2020     Albumin:   Albumin   Date Value Ref Range Status   09/15/2020 2.9 (L) 3.5 - 5.2 g/dL Final     Protein:   Total Protein   Date Value Ref Range Status   09/15/2020 7.5 6.0 - 8.4 g/dL Final     Lactic acid:   No results found for: LACTATE    Significant Imaging: CT: I have reviewed all pertinent results/findings within the past 24 hours:   CT Chest/Abdomen/Pelvis: The liver is enlarged in size, measuring 19 cm in craniocaudal diameter.  Diffuse heterogeneous enhancement of the hepatic parenchyma.  Ill-defined hypoenhancing lesion of the caudate lobe approximately measuring 5.9 x 5.8 cm with mass effect on the IVC.  The main portal vein, right and left portal veins are patent.  The hepatic arteries are patent.  Few subcentimeter para-aortic and pericaval lymph nodes, none enlarged per CT criteria.  Mild peritoneal nodularity in the right upper abdomen as seen on axial series 2, image 147.  Small volume of free fluid in the pelvis.  Multiple small cristiana hepatis lymph nodes measuring up to 1 cm (axial series 2, image 102).      Findings could be related to nonuniform fibrofatty infiltration and [place] cirrhosis, but multifocal primary or metastatic hepatic malignancy including cholangiocarcinoma are also to be considered and should be included in the differential diagnosis.     CT Head: No evidence of acute intracranial pathology         > 50% of 80 min visit spent in chart review, face to face discussion of goals of care,  symptom assessment, coordination of care and emotional support.    Tyson Melgar MD  Palliative Medicine  [facility]

## 2020-09-18 NOTE — HPI
40 y.o.male admitted for workup for a liver mass diagnosed by CT scan. He has had 3 months of worsening left foot pain. This pain has progressed to also involve his lower leg. He has been evaluated by a podiatrist and orthopedist, but after injections, steroids and gabapentin, this pain has not improved.  MRI of leg and foot was unrevealing. At the time of admission, patient was taking ibuprofen 20-30 per day for 2-3 weeks. He has also had worsening pitting edema LE, extensive ecchymosis, and hematochezia. He has also had unintentional 20 pound weight loss. Doesn't sleep more than 1-2 hours a night due to pain and insomnia.  Admits to overwhleming stress due to job duties and recent lifestyle changes.    Concern for liver mass noted on recent CT scan.  Pt admitted for further workup and requesting palliative medicine involvement

## 2020-09-18 NOTE — PROGRESS NOTES
Progress note:   Discussed with pt, primary team, and consultants.  Will remain involved to help support in anyway.      Tyson Melgar MD  Palliative Medicine

## 2020-09-18 NOTE — PROGRESS NOTES
Medical Oncology Progress Note    Subjective:       Patient ID: Сергей Ragsdale is a 40 y.o. male.    Chief Complaint: Liver Mass    No acute events overnight, patient was interviewed after endoscopy.  Patient still has uncontrolled pain and did not sleep last night due to colon prep.    Review of Systems:  Review of Systems   Constitutional: Positive for fatigue and unexpected weight change. Negative for activity change, chills and fever.   HENT: Negative for mouth sores, nosebleeds and sore throat.    Respiratory: Negative for cough, shortness of breath and wheezing.    Cardiovascular: Negative for chest pain, palpitations and leg swelling.   Gastrointestinal: Positive for blood in stool. Negative for abdominal distention and abdominal pain.   Endocrine: Negative for cold intolerance and heat intolerance.   Genitourinary: Negative for dysuria, flank pain and frequency.   Musculoskeletal: Negative for arthralgias, joint swelling and myalgias.   Skin: Negative for color change, rash and wound.        echymoses   Neurological: Positive for dizziness. Negative for light-headedness and headaches.   Hematological: Negative for adenopathy. Bruises/bleeds easily.   Psychiatric/Behavioral: Positive for dysphoric mood and sleep disturbance.        Objective:     Vitals:    09/17/20 2111   BP:    Pulse:    Resp: 18   Temp:      Physical Exam  Constitutional:       General: He is not in acute distress.     Appearance: Normal appearance. He is well-developed.   HENT:      Head: Normocephalic and atraumatic.   Eyes:      Conjunctiva/sclera: Conjunctivae normal.      Pupils: Pupils are equal, round, and reactive to light.   Neck:      Musculoskeletal: Normal range of motion and neck supple.   Pulmonary:      Effort: Pulmonary effort is normal.      Breath sounds: No stridor.   Abdominal:      Palpations: Abdomen is soft.      Tenderness: There is no abdominal tenderness.   Musculoskeletal: Normal range of motion.       Left lower leg: Edema (3+) present.   Lymphadenopathy:      Cervical: No cervical adenopathy.   Skin:     General: Skin is warm and dry.      Findings: Bruising present.   Neurological:      General: No focal deficit present.      Mental Status: He is alert and oriented to person, place, and time.          Lab Review:   CBC with macrocytic anemia.  CMP with elevated bilirubin to 2.8 with improving AST and ALT from 12/2019.  Patient also had these lab abnormalities in 12/2019.     Assessment:       1. Elevated LFTs    2. Liver mass    3. Multiple skin nodules    4. Pain    5. Hematochezia    6. Nausea and vomiting, intractability of vomiting not specified, unspecified vomiting type    7. Excessive use of nonsteroidal anti-inflammatory drug (NSAID)    8. Macrocytic anemia    9. Cirrhosis of liver without ascites, unspecified hepatic cirrhosis type         Plan:         1,2 MRI liver does not show a discrete mass.  After discussing with radiology, this was likely a patch of cirrhosis with hypertrophy of caudate lobe causing some compression of IVC.    9 Consulting hepatology for workup of cirrhosis.  Hepatitis panel negative.  Will likely need liver biopsy, planning on performing this inpatient if possible.    3 These have been self-resolving except for R posterior thigh nodule.  US shows 2 nodules < 1.5 cm most consistent with lymph nodes.  If these progress, should get a biopsy    4. Patient has had a significant workup including MRI brain, spine, and lower extremity without an identified source of pain. Palliative care consulted. Discontinuing gabapentin as this has not helped his pain.  Continuing prn and long-acting morphine.    5,7 Colonoscopy with angiodysplasia bleeding, likely cause of hematochezia.  EGD with esophageal varices.    8 Vitamin B12 panel ordered and pending.    Jv Kwon MD  Medical Oncology   [facility]  [facility]

## 2020-09-18 NOTE — TELEPHONE ENCOUNTER
I left a VM for the pt to call us back to schedule a hospital f/u in a month.    ----- Message from Faye Sevilla RN sent at 9/18/2020  3:19 PM CDT -----  Regarding: hospital discharge  This is a patient Dr. COWAN saw in the hospital and Dr. COWAN would like to see him in clinic in 4 weeks.  No need for labs in the interim.   Thanks  Liana

## 2020-09-18 NOTE — CONSULTS
PSYCHIATRY CONSULT INITIAL EVALUATION     DEPARTMENT:  Psychiatry  SITE: [facility], [address]    DATE OF ADMISSION: 9/16/2020 11:24 AM  LENGTH OF STAY: 1 days    EXAMINING PRACTITIONERS: Oneal French (resident) and Charlie Gardner (staff)    CONSULT REQUESTED BY: Sandy Javier MD      SUBJECTIVE     CHIEF COMPLAINT  Patient is a 40 y.o. male who is seen today for an initial psychiatric evaluation   Patient presents with the chief complaint of: depression      HISTORY OF PRESENT ILLNESS    Per Primary MD:  40 year old man with unremarkable medical history who is presenting to hospital with concern for BRBPR and hepatic mass; hepatology consulted due to concern for hepatic mass.     He presented to oncology on 9/16 due to concern for 3 month history of progressive symptoms. Initial symptoms included leg pain which has been unresolving despite therapy including podiatry/ortho, injections, steroids and gabapentin. Over the past 2 weeks has experienced rapid progression with pitting edema, ecchymosis, and brbpr and vomitus and 20lb weight loss. CTAP was concerning for caudate lobe mass (5.9 x 5.8cm with mass effect on IVC, mild peritoneal nodularity and multiple small cristiana hepatis LN. He was admitted for workup.     Labs notable for HepB immunity, HCV negative, anemia (hgb 9.7, baseline 14 12/2019), Tb 2.7, , ALT 40, . CEA 3.0, CA 19-9 38, AFP 6.7. MRI brain, spine negative. MRI triple phase abdomen with hepatic steatosis, heterogeneous liver with portal hypertension changes including hypertrophied caudate lobe, varices, and small volume ascites. Iron sat 31%, ferritin 661.     He reports history as above. Denies any knowledge of liver disease though reports he has had mildly abnormal LFTs in the past and had been told it was related to fatty liver. Only prior LFT in system 12/2019 Tb 1.8, , .  Denies any history of hospitalization prior to this. Endorses 15-20lb weight  loss over past 4-6 months but denies ever being significantly overweight (heaviest 190lb)    Per Psych MD:    Pt resting in bed, calm, cooperative, pleasant.  AAOx3.  Appropriate affect.  He reports the past few days having been a whirlwind with his recent medical workup.  He states he is in good spirits today and is doing well.  He admits to being stressed due to the initial concerns for concerns for cancer given his presenting symptoms.  You always start to think of the worst medical possibilities and that was really getting to me.  He notes improvement in mood after return of the updated CT scan and plan for outpatient liver biopsy.     He saw the psychologist in the oncology department yesterday.  He reports seeking her out for outpatient resources to help deal with his new life stressors (potential cancer).  He does not mention or acknowledge any other contributing major life stressors, even when directly asked about family, friends, relationships and work.  He has a hx of MDD with previous medication trials of buspar, Wellbutrin, Latuda, Effexor.  None of these medications helped him in the past, but found therapy via psychology to be very helpful. This was his motivation for seeking out psychology yesterday.      He denies current SI or any active/passive thoughts of self harm over the past week.  He reports he is very happy with the updates that there is less concern for cancer at this time.  He notes his occupation and family as motivating factors to keep living.  He is still legally  to his ex-wife who suffers from MS.  He is assists with her medical care and medications and notes that he needs to be present to care for her.  He also currently has a relationship with his friend Renetta which things are going well.  He denies any major current strife with either.     He reports hx of multiple episodes of MDD with SI and self harm behaviors (horizontal lacerations to RLE, RUE) over 5 years ago,  but none recently.  During these depressive episodes, he experiences severe anhedonia, no appetite, weight loss, poor energy and hopelessness (he denies any of these symptoms currently of within the last 5 years).  Each depressive episode was triggered by external factors, primarily attributable to his ending marriage of 11 years (he and his wife  4.5 years ago).  He describes their marriage as toxic but they remain on good terms today.  During a few of these depressive episodes, he began to experience SI which was very concerning to him.  He endorsed thinking of an unspecified plan once or twice which was alarming to him and precipitated self removal of all guns from the home.   Since his separation with his wife, he things have gone well, denies any recurring depressive episodes.  No hx of teddy.  Denies HI/AVH.    Endorses EtOH history starting about 15 years ago.  Describes himself as a social drinker.  Reports drinking less with the COVID shutdown due to bars being closed.  Denies ever drinking alone, getting into precarious situations while intoxicated or feeling that his EtOH use was a problem.   Drink of choice is bourbon which he drinks a glass or two a 1-2 days per week if he doesnt have work or call.  He reports that a handle of bourbon will last him 3-4 weeks.  He denies ever drinking consecutive days, getting blackout drunk or drinking to go to sleep.  Denies ever drinking in the morning and typically drinks to wind down in the evening.  Denies EtOH withdrawal history.  Never experienced tremors, DTs, seizures.  No hx of detox/rehab.        He reiterates that he currently has no SI or desire for self harm.  He is against seeking psychiatric hospitalization as he does not believe it is currently warranted and it would only make things much worse.  He is forward thinking in terms of motivations for living and plans once he return home.  He displays good insight and has taken preventative  measures in the past in terms of ensuring his own safety in the setting of SI (removed weapons/guns from home).  He is help seeking in terms of requesting outpatient resources for clinical psychologists for therapy as this is what has helped him most in the past.  He is not interested in medication initiation at this time but may consider it in the future.        Objectively, patient appears in good spirits.  He is able to smile and makes jokes several times during the interview.  He mentions his ex-wife and current partner as reasons to live/people he needs to be around to care for.  He does not become tearful or break down at any point of the interview.  He is able to participate in meaningful conversation while focusing on positive aspects.  He is able to explain in detail the symptoms he experienced during previous depressive episodes and SI with prominent anhedonia and hopelessness, none of which he is experiencing now.  He verbalizes plan to seek psychiatric care should he begin experiencing depression or SI.        PSYCHIATRIC ROS  sleep: always been a poor sleeper 5-6 hours per night, no recent changes  appetite: decreased which he attributes to his GI symptoms including nausea/vomiting/hematochezia  weight: yes, weight loss 20lbs over 6 mo  energy/anergy: normal  interest/pleasure/anhedonia: normal  somatic symptoms: no  guilty/hopelessness: no  concentration: no  S.I.B.s/risky behavior: denies  SI/SA:  denies    anxiety/panic: denies  Agoraphobia:  denies  Social phobia:  denies  Recurrent nightmares:  denies  hyper startle response:  no  Avoidance: no  Recurrent thoughts:  no  Recurrent behaviors:  no    Irritability: denies  Racing thoughts: no  Impulsive behaviors: no  Pressured speech: no    Paranoia: no  Delusions: no  AVH: no    PSYCHIATRIC HISTORY  Reported Diagnose(s): MDD  Previous Medication Trials: Latuda, Effexor, Wellbutrin, Buspar  Previous Psychiatric Hospitalizations: no  Previous Suicide  Attempts: No.  Cutting behavior horizontal lacerations to RLE, RUE during major depressive episode over 5 years ago  History of Violence: no  Outpatient Psychiatrist?: no      SUICIDE/HOMICIDE RISK ASSESSMENT  Current/active suicidal ideation/plan/intent: denies  Previous suicide attempts: denies  Current/active homicidal ideation/plan/intent: denies      SUBSTANCE ABUSE HISTORY  Substance(s) of Choice: EtOH - bourbon  Substances Used: etoh  History of IVDU?: no  Use of Alcohol: occasional - socially  Average Consumption: 1-2 glasses of bourbon/night on his off days  Last Drink: 5 weeks ago  Use of Medications for Alcohol/Opioid Use Disorder: denies  History of Complicated Withdrawal: denies  History of Detox: denies  Rehab History: denies  AA/NA involvement: no  Tobacco: no  Spouse/Partner Consumption: no  Patient Aware of Biomedical Complications: yes      DSM-5 SUBSTANCE USE DISORDER CRITERIA   Mild (1-3), Moderate (4-5), Severe (?6)  1. Often take in larger amounts or over a longer period of time than was intended. denies  2. Persistent desire or unsuccessful efforts to cut down or control use.  denies  3. Great deal of time spent in activities necessary to obtain substance, use, or recover from effects. denies  4. Craving/strong desire for substance or urge to use.  denies  5. Use resulting in failure to fulfill major role obligations at home, work or school.  denies  6. Social, occupational, recreational activities decreased because of use.   denies  7. Continued use despite having persistent or recurrent social or interpersonal problems cause or exacerbated by the substance.  denies  8. Recurrent use in situations in which it is physically hazardous.  denies  9. Use despite physical or psychological problems that are likely to have been caused or exacerbated by the substance.  denies  10. Tolerance, as defined by either of the following.   denies   A. A need for markedly increased amounts of substance to  achieve intoxication or desired effect. -OR-    B. A markedly diminished effect with continued use of the same amount of substance.  11. Withdrawal, as manifested by the following.    denies   A. The characteristic withdrawal syndrome for substance. -AND-   B. Substance is taken to relieve or avoid withdrawal symptoms.    ARE THE CRITERIA MET FOR DSM-5 SUBSTANCE USE DISORDER:   No      HISTORY OF TRAUMA, ABUSE & VIOLENCE  Trauma: no  Physical Abuse: no - grandfather was very strict, hard disciplinarian. Some methods of discipline borderline abuse (locking in closet for an hour)  Sexual Abuse: no  Violent Conduct: no    Access to Gun: no      FAMILY PSYCHIATRIC HISTORY   denies      SOCIAL HISTORY  Lives alone in [facility] in [place].  Has current friend vs partner who he goes on dates with over the past 2 years.    Has an ex-wife who he is  from but legally  to.  No children.  No  history.    Currently does not have access to a gun.  No plans to get one.   No legal history.      PAST MEDICAL HISTORY   hypertension      PSYCHOSOCIAL FACTORS  Stressors (Psychosocial and Environmental): health, occupation       MEDICAL ROS    Complete review of systems performed covering Constitutional, Eyes, ENT/Mouth, Cardiovascular, Respiratory, Gastrointestinal, Genitourinary, Musculoskeletal, Skin, Neurologic, Endocrine, Heme/Lymph, and Allergy/Immune.     Complete review of systems was negative with the exception of the following positive symptoms: LLE pain, bruising      ALLERGIES  Patient has no known allergies.      MEDICATIONS    Psychotropics:  none    Infusions:      Scheduled:   gabapentin  300 mg Oral BID    morphine  15 mg Oral QHS    nebivoloL  10 mg Oral Daily    nortriptyline  25 mg Oral QHS       PRN:  lorazepam, morphine, ondansetron, oxyCODONE, sodium chloride 0.9%    Home Medications:  Prior to Admission medications    Medication Sig Start Date End Date Taking? Authorizing Provider  "  gabapentin (NEURONTIN) 300 MG capsule Take 2 capsules (600 mg total) by mouth every evening. 8/7/20 8/7/21  Alban Wilson MD   nebivoloL (BYSTOLIC) 10 MG Tab Take 1 tablet (10 mg total) by mouth once daily. 3/20/20   Roney Love MD         OBJECTIVE:     EXAMINATION    Vitals:    09/17/20 1519 09/17/20 1802 09/17/20 1915 09/17/20 2111   BP: 130/66  137/85    BP Location: Right arm  Right arm    Patient Position: Sitting  Lying    Pulse: 84  76    Resp: 16 18 18 18   Temp: 97.7 °F (36.5 °C)  98.9 °F (37.2 °C)    TempSrc: Oral  Oral    SpO2: 98%  99%    Weight:       Height:           PAIN   7/10, 5/10 with morphine    CONSTITUTIONAL  General Appearance and Manner: NAD, AAOx3    MUSCULOSKELETAL  Abnormal Involuntary Movements: none    PSYCHIATRIC   Orientation: AAOx3  Speech: normal rate, tone, volume  Language: normal, fluid, no prosody   Mood: "adjusting, improving"  Affect: mood congruent, appropriate  Thought Process: Linear, goal oriented, organized  Associations: intact, logical  Thought Content: No SI/HI/AVH  Memory: 3/3, 3/3  Attention and Concentration: Intact to conversation  Fund of Knowledge: Intact to conversation  Insight: good  Judgment: Appropriate for setting      Case discussed with staff addiction psychiatrist: HEIKE GREGORY MD      STAFF NOTE:    Please see above for resident documentation.  I have discussed the case with Dr. French.    I spoke personally with patient today - spending about 1.5 hours of time with him spread across two separate sessions.      I spoke directly with his primary attending physician Dr. Kwon and psychologist Dr. Weber to discuss the case.    I obtained collateral from friend Renetta as well, speaking to her for approximately 30 minutes.  Patient gave me permission to speak with Renetta.  He declined further collateral.  I spoke with Renetta both in the presence of patient and also a phone conversation without him.  Given she reported near " "daily contact with patient and was identified as a major source of support, she was deemed a sufficient collateral.        We were asked to perform a clinical risk assessment on this patient.  Dr. Weber had previously evaluated him and documented the following in her note 9/16/20:    "Severe episode of recurrent major depressive disorder, without psychotic features  Recurrent major depression, with prior suicidality  (potential historical manic episodes, not fully assessed)     Current severe major depression with suicidal ideation, without intent or plan.  Likely exacerbated by sleep deprivation due to pain."    I spoke with Dr. Weber personally to get more details about her evaluation.  Dr. Weber further clarified her assessment to me.  In terms of suicidal ideation, she clarified that he reported passive suicidal ideation.  He did not report active suicidal ideation.  He had no current intent or plan.  She did not believe he was an imminent risk to self and did not feel he met criteria for involuntary psych admit - she did not execute PEC upon completion of her examination.    Resident spoke with patient last night and I did again today to further clarify risk.  He reports at this time he is feeling better.  He denies current depressive symptoms.  He completed a York's Depression Inventory and scored 3/63 which is consistent with absence of depression.  I also utilized a Love Depression Rating Scale and a Gibson Suicide Severity Rating Scale to help guide my clinical assessment and both of these measures fell well below cutoffs that would trigger concern for imminent risk and thereby trigger need for acute psychiatric hospitalization.  Of note, although rating scales were employed, and therefore figured into my clinical reasoning and assessment, they were deemed by me to play only a supporting and limited role in this particular case.  He denied to me any current passive SI, active SI, intent, " or plan.  He was future oriented.  He denied hopelessness, worthlessness, excessive guilt.  In terms of reports of passive SI made to Dr. Weber he attempted to clarify with me stating that if he were to have been diagnosed with a poor prognosis painful terminal cancer he would want to pursue a palliative option.      I interviewed his friend Vanda.  She reports she is aware of his past issues with depression, his past issues with SI and that she is aware of the difficulties he has been having recently.  She stated that he has not expressed any SI to her, nor is she concerned for active SI or suicidal behavior at this time.  She was able to cite several reasons for why she believed he was safe and not an imminent risk.  She echoes him in stating she believes he would benefit from outpatient psychotherapy as part of aftercare plan.  She would like for him to have his sleep and pain addressed as she worries these are stressors for depression for him.  Patient was started on nortriptyline here in hospital to address both - he reports to me that pain today is improved and that sleep was better last night but it was difficult to fully evaluate given he was woken up multiple times for vitals, labs, etc.    Vanda states she would like for him to stay with her after discharge for extra support - patient agreeable to this and I encouraged this as well.  he has no access to firearms, neither does Vanda.  I did spend a significant portion of time discussing various additional risk mitigation strategies with patient.    Summing up, I concur with Dr. Weber and Dr. French that patient is not an imminent risk to self or others, nor gravely disabled and does not meet criteria for involuntary psych hosp.  I do suspect that the depression persists in a state above what he self reported to me today (refer above to score on York Depression Inventory) - especially given his interview two days ago with Dr. Weber -  "but regardless this would still not raise above a threshold of concern given Dr. Weber's findings during her evaluation.  He does appear to be improved compared to her evaluation, and there has been a reduction in stressors relative to that time.    We also screened patient for alcohol use disorder.  We went through symptoms point by point and he denied them all - states he does not believe that he has an alcohol use disorder nor does he need rehab or treatment.  See above for this documentation.  I also spoke with Renetta who stated to me she is not concerned he has a problem with alcohol.  She characterized his drinking as "social" - "nothing overly egregious" - "I'm not aware of anything excessive in his drinking" - "I've never witnessed anything".  She states his usual consumption is a "couple drinks".  Although she is not with him daily as they don't live together - she states they talk near daily and she sees him on average five times a week and so feels she can comment fairly cogently on his drinking.     Having said this, I am aware patient could be minimizing his drinking.  I did discuss with him that I question his denial of criteria 9: Use despite physical or psychological problems that are likely to have been caused or exacerbated by the substance.  He reports to me isolated days where he has exceeded NIDAA healthy limit (no more than 4 drinks at one time) - stating he estimates at least 1 day of 5 drinks in past month and in period between October 2019 and January 2020 at least 1 day of 6 drinks.  And this is despite knowledge of having both depression and elevated LFTs - both of which could be exacerbated by alcohol.  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  He had a PETH test drawn yesterday - results will not be back for usually 1-2 weeks.  An elevated PETH test would be suggestive of an alcohol use disorder - especially given the degree of elevation - but it would still " not be conclusive. He currently has liver disease which is further suggestive of alcohol use disorder though again this not conclusive and GI notes there are other possible etiologies on the differential.  As it currently stands, even changing criteria 9 to positive based on my objective evaluation, it still would be only 1 symptom and you need 2 symptoms to meet criteria for mild alcohol use disorder.  Exceeding NIDAA healthy limits is not a criteria for alcohol use disorder, though again it is worrisome and suggestive.  He denies exceeding weekly suggested limits.    I counseled patient on need for strict abstinence moving forward.  I offered treatment for alcohol use disorder and encouraged attendance at 12 step meetings as even in the absence of a diagnosable alcohol use disorder he would likely benefit from these interventions.  One does not need to have diagnosed alcohol use disorder in order to attend and find benefit from AA meetings.  I also discussed with patient that my assessment is based on the information available to me at this time, and that facilities do exist that offer more extensive 3 day evaluations - I gave him the names of these facilities if he would like to avail himself of a more indepth, thorough evaluation.  My evaluation is limited in scope as to the time parameters cited above.      In conclusion, I do not believe patient meets criteria for involuntary psych hosp based on my clinical reasoning and risk assessment as outlined above.  My assessment is in agreement with two other providers' assessment (Dr. French and Dr. Weber).  Therefore treatment is voluntary and can be declined.  I did discuss various treatment options for him including voluntary inpatient psychiatric admit, intensive outpatient day program, residential rehab, intensive outpatient rehab program, referral to psychiatrist and/or psychotherapist.  Although clinical depression can respond favorably to all of these  interventions and modalities, I did discuss with him that he may very well see a more robust and quicker response with a more structured and intensive intervention - either in an intensive outpatient or residential setting - however he declines this level of care citing that he does not prefer groups and prefers an outpatient approach.  He would like to pursue treatment with an outpatient psychotherapist as a first step.  He states he spoke with Dr. Weber who has a referral for him to see a psychotherapist as an outpatient moving forward. I also communicated with Dr. Weber to assure she could assist with referral for continuity of care and she confirmed she would be able to do so - thereby ensuring continuity of care and that patient has proper follow up to continue treatment after discharge.  I did speak with him about the possibility of reinitiating a psychotropic trial but he declines further medication trials at this time, citing past limited efficacy and bothersome side effects.  However he was initiated on low dose nortriptyline for pain and insomnia - and this is in the antidepressant class (TCA).  The dose is quite low but it could have some antidepressant effects.  Furthermore reduction of pain and insomnia are both likely to reduce depression and risks for future SI independently of its antidepressant effects.      Again, this consultation, including my clinical risk assessment and screening for alcohol use disorder, are based upon the information currently available to me provided by the chart, various other providers, the patient, and collateral Renetta.  If further information comes to light, this would necessarily require reassessment and adjustment of recommendations.    In terms of approach, I took the following broad approach - thorough assessment including estimation of clinical risk, discussion of treatment options including potential benefits and draw backs, efforts to reduce risk moving  forward through risk mitigation strategies and efforts to ensure continuity of care.  Note, this is not an exhaustive list of interventions employed during the assessment but a broad general outline.  This patient has static risk factors that will remain with him through his life and thereby render him at higher risk to the general population. He has dynamic risk factors as well.  His overall risk was noted to be at a level currently below a threshold warranting involuntary psych admission.  As dynamic risk factors can change, and new static and dynamic risk factors can emerge, risk mitigation strategies and referral to treatment with efforts to assure continuity of care and smooth transition (see above) were employed.      Thank you for the consult.    Charlie Gardner MD        ASSESSMENT:     DIAGNOSES & PROBLEMS  Recurrent MDD        PLAN:       MANAGEMENT PLAN, TREATMENT GOALS, THERAPEUTIC TECHNIQUES/APPROACHES & CLINICAL REASONING    See above for full details.    · Dispo/Legal Status: Patient does not meet criteria for PEC or inpatient psychiatric admission at this time.  Patient is not currently an imminent danger to self or others and is not gravely disabled due to a psychiatric illness. Will provide OP resources.    · Medication Recommendations: initiated on low dose nortriptyline by primary team.  Declines other potential psychotropic trials at this time.  · Follow-up with: Outpatient psychology

## 2020-09-18 NOTE — NURSING
Patient is discharged home, patient left the facility without his discharge paperwork.  I called and spoke with patient over the phone and went over his prescriptions , home meds and medications he had to  from [facility].  I explained all discharge orders to him, verbalized understanding of all discharge orders.  Road Test:  O:  No oxygen in use while at the facility. A:  Ambulates with  Difficulty during the shift, D:  Left forearm iv was taken out before he left the facility, T:  Last bowel movement 9/17/20, voiding without difficulty, E:  Tolerated a regular diet without difficulty, S:  Self care independently, T:  Discharge teaching done over the phone because he left the facility without his paperwork.  Patient said he had to run an errand and he would come back to the hospital to  his meds from pharmacy.

## 2020-09-18 NOTE — ANESTHESIA POSTPROCEDURE EVALUATION
Anesthesia Post Evaluation    Patient: Сергей Ragsdale    Procedure(s) Performed: Procedure(s) (LRB):  EGD (ESOPHAGOGASTRODUODENOSCOPY) (N/A)  COLONOSCOPY (N/A)    Final Anesthesia Type: general    Patient location during evaluation: PACU  Patient participation: Yes- Able to Participate  Level of consciousness: awake and alert  Post-procedure vital signs: reviewed and stable  Pain management: adequate  Airway patency: patent    PONV status at discharge: No PONV  Anesthetic complications: no      Cardiovascular status: blood pressure returned to baseline and stable  Respiratory status: unassisted, spontaneous ventilation and room air  Hydration status: euvolemic  Follow-up not needed.          Vitals Value Taken Time   /77 09/17/20 1346   Temp 36.8 °C (98.2 °F) 09/17/20 1331   Pulse 73 09/17/20 1400   Resp 15 09/17/20 1400   SpO2 99 % 09/17/20 1400   Vitals shown include unvalidated device data.      Event Time   Out of Recovery 14:02:56         Pain/Carmenza Score: Pain Rating Prior to Med Admin: 7 (9/17/2020  6:02 PM)  Pain Rating Post Med Admin: 2 (9/17/2020  6:01 AM)  Carmenza Score: 10 (9/17/2020  2:02 PM)

## 2020-09-18 NOTE — ASSESSMENT & PLAN NOTE
41 yo male with no sig past med hx other than mood disorder/depression admitted for further work up of hepatic mass, rectal bleeding, foot pain, and insomnia    1) Symptoms:  Left foot arthralgia: unknown etiology; has had an extensive outpt work up; discussed with primary team ? 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  Including consultants as needed.    Discussed with primary team.

## 2020-09-18 NOTE — H&P
Medical Oncology History and Physical    Subjective:       Patient ID: Сергей Ragsdale is a 40 y.o. male.    Chief Complaint: No chief complaint on file.      40 y.o.male admitted for workup for a liver mass diagnosed by CT scan. He has had 3 months of worsening left foot pain. This pain has progressed to also involve his lower leg. He has been evaluated by a podiatrist and orthopedist, but after injections, steroids and gabapentin, this pain has not improved.  MRI of leg and foot was unrevealing. At the time of admission, patient was taking ibuprofen 20-30 per day for 2-3 weeks. He has also had worsening pitting edema LE, extensive ecchymosis, and hematochezia. He has also had unintentional 20 pound weight loss.            Review of Systems:  Review of Systems   Constitutional: Positive for fatigue and unexpected weight change. Negative for activity change, chills and fever.   HENT: Negative for mouth sores, nosebleeds and sore throat.    Respiratory: Negative for cough, shortness of breath and wheezing.    Cardiovascular: Negative for chest pain, palpitations and leg swelling.   Gastrointestinal: Positive for blood in stool. Negative for abdominal distention and abdominal pain.   Endocrine: Negative for cold intolerance and heat intolerance.   Genitourinary: Negative for dysuria, flank pain and frequency.   Musculoskeletal: Negative for arthralgias, joint swelling and myalgias.   Skin: Negative for color change, rash and wound.        echymoses   Neurological: Positive for dizziness. Negative for light-headedness and headaches.   Hematological: Negative for adenopathy. Bruises/bleeds easily.   Psychiatric/Behavioral: Positive for sleep disturbance.        Objective:     Vitals:    09/17/20 1915   BP: 137/85   Pulse: 76   Resp: 18   Temp: 98.9 °F (37.2 °C)     Physical Exam  Constitutional:       General: He is not in acute distress.     Appearance: Normal appearance. He is well-developed.   HENT:       Head: Normocephalic and atraumatic.   Eyes:      Conjunctiva/sclera: Conjunctivae normal.      Pupils: Pupils are equal, round, and reactive to light.   Neck:      Musculoskeletal: Normal range of motion and neck supple.   Pulmonary:      Effort: Pulmonary effort is normal.      Breath sounds: No stridor.   Abdominal:      Palpations: Abdomen is soft.      Tenderness: There is no abdominal tenderness.   Musculoskeletal: Normal range of motion.      Left lower leg: Edema (3+) present.   Lymphadenopathy:      Cervical: No cervical adenopathy.   Skin:     General: Skin is warm and dry.      Findings: Bruising present.   Neurological:      General: No focal deficit present.      Mental Status: He is alert and oriented to person, place, and time.          Lab Review:   CBC with macrocytic anemia.  CMP with elevated bilirubin to 2.8 with improving AST and ALT from 12/2019.  Patient also had these lab abnormalities in 12/2019.     Assessment:       1. Elevated LFTs    2. Liver mass    3. Multiple skin nodules    4. Pain    5. Hematochezia    6. Nausea and vomiting, intractability of vomiting not specified, unspecified vomiting type    7. Excessive use of nonsteroidal anti-inflammatory drug (NSAID)    8. Macrocytic anemia    9. Cirrhosis of liver without ascites, unspecified hepatic cirrhosis type         Plan:         1,2 MRI liver does not show a discrete mass.  After discussing with radiology, this was likely a patch of cirrhosis with hypertrophy of caudate lobe causing some compression of IVC.    9 Consulting hepatology for workup of cirrhosis    3 These have been self-resolving except for R posterior thigh nodule.  US shows 2 nodules < 1.5 cm most consistent with lymph nodes.  If these progress, should get a biopsy    4. Patient has had a significant workup including MRI brain, spine, and lower extremity without an identified source of pain.  Currently on gabapentin.  Will continue and add MS Contin as patient says  this is causing insomnia.    5,7 Consulted GI for EGD and colonoscopy    8 Vitamin B12 panel ordered.    Jv Kwon MD  Medical Oncology   [facility]  [facility]

## 2020-09-19 LAB — VIT B12 SERPL-MCNC: 324 NG/L (ref 180–914)

## 2020-09-21 ENCOUNTER — TELEPHONE (OUTPATIENT)
Dept: INTERVENTIONAL RADIOLOGY/VASCULAR | Facility: CLINIC | Age: 40
End: 2020-09-21

## 2020-09-21 ENCOUNTER — TELEPHONE (OUTPATIENT)
Dept: HEMATOLOGY/ONCOLOGY | Facility: CLINIC | Age: 40
End: 2020-09-21

## 2020-09-21 ENCOUNTER — TELEPHONE (OUTPATIENT)
Dept: HEPATOLOGY | Facility: CLINIC | Age: 40
End: 2020-09-21

## 2020-09-21 ENCOUNTER — TELEPHONE (OUTPATIENT)
Dept: INTERVENTIONAL RADIOLOGY/VASCULAR | Facility: HOSPITAL | Age: 40
End: 2020-09-21

## 2020-09-21 LAB
FINAL PATHOLOGIC DIAGNOSIS: NORMAL
GROSS: NORMAL

## 2020-09-21 NOTE — TELEPHONE ENCOUNTER
Left message for pt to return my call. Need to schedule IR consult. Please forward call to [phone]. Thanks

## 2020-09-21 NOTE — TELEPHONE ENCOUNTER
----- Message from Noemi Grimm sent at 9/21/2020 10:02 AM CDT -----  Contact: patient  Name Of Caller: Сергей    Provider Name: Jv quintana     Does patient feel the need to be seen today? no    Relationship to the Pt?:  Patient     Contact Preference?: [phone]    What is the nature of the call?: Patient would like a call back regarding his surgery he supposed to have

## 2020-09-21 NOTE — TELEPHONE ENCOUNTER
----- Message from Noemi Grimm sent at 9/21/2020  9:59 AM CDT -----  Contact: patient  Name Of Caller: Сергей    Provider Name: RICHARD GOLDBERG    Does patient feel the need to be seen today? No     Relationship to the Pt?:  Patient     Contact Preference?: [phone]    What is the nature of the call?: Patient called and would like to speak to your office to schedule his surgery he has a referral in his chart

## 2020-09-21 NOTE — TELEPHONE ENCOUNTER
I spoke to the pt and scheduled his hospital f/u as a VV on the 7th at 4:30pm to discuss his biopsy results. I explained that he will need the [employer] pedro and I mailed him the reminder and instructions to the address at [address] [place] [address]

## 2020-09-22 ENCOUNTER — TELEPHONE (OUTPATIENT)
Dept: HEPATOLOGY | Facility: CLINIC | Age: 40
End: 2020-09-22

## 2020-09-22 ENCOUNTER — PATIENT MESSAGE (OUTPATIENT)
Dept: GASTROENTEROLOGY | Facility: HOSPITAL | Age: 40
End: 2020-09-22

## 2020-09-22 DIAGNOSIS — R93.2 ABNORMAL LIVER SCAN: Primary | ICD-10-CM

## 2020-09-22 DIAGNOSIS — R79.89 ABNORMAL LIVER FUNCTION TESTS: Primary | ICD-10-CM

## 2020-09-22 LAB
HBV E AG SERPL QL IA: NORMAL
METHYLMALONATE SERPL-SCNC: 0.07 NMOL/ML
SMOOTH MUSCLE AB TITR SER IF: ABNORMAL {TITER}

## 2020-09-22 RX ORDER — FENTANYL CITRATE 50 UG/ML
50 INJECTION, SOLUTION INTRAMUSCULAR; INTRAVENOUS
Status: CANCELLED | OUTPATIENT
Start: 2020-09-22

## 2020-09-22 RX ORDER — MIDAZOLAM HYDROCHLORIDE 1 MG/ML
1 INJECTION INTRAMUSCULAR; INTRAVENOUS
Status: CANCELLED | OUTPATIENT
Start: 2020-09-22

## 2020-09-22 NOTE — TELEPHONE ENCOUNTER
I left a VM for the pt to call me back if he can do his labs today. If not, I scheduled them tomorrow morning at 10am prior to his biopsy.

## 2020-09-22 NOTE — NURSING
Pre-op call complete.     Pre procedure instructions given for liver bx. Pt instructed not to eat or drink after midnight. Allergies reviewed.  to provide transport and monitor pt 8 hrs. Pt denied anticoagulation medications. Home medications reviewed with patient. Pt instructed to check in to second floor DOSC desk at 11am. Expected length of stay reviewed.  Covid screening complete.  Pt verbalizes understanding. Questions answered.

## 2020-09-22 NOTE — TELEPHONE ENCOUNTER
[facility]

## 2020-09-22 NOTE — TELEPHONE ENCOUNTER
The pt called back and said tomorrow at 10am would be best. I also went over the check in times for the procedure as well with him.

## 2020-09-23 ENCOUNTER — HOSPITAL ENCOUNTER (OUTPATIENT)
Facility: HOSPITAL | Age: 40
Discharge: HOME | End: 2020-09-23
Attending: INTERNAL MEDICINE | Admitting: INTERNAL MEDICINE
Payer: COMMERCIAL

## 2020-09-23 VITALS
BODY MASS INDEX: 24.48 KG/M2 | HEIGHT: 70 IN | RESPIRATION RATE: 16 BRPM | WEIGHT: 171 LBS | SYSTOLIC BLOOD PRESSURE: 116 MMHG | TEMPERATURE: 99 F | HEART RATE: 74 BPM | DIASTOLIC BLOOD PRESSURE: 68 MMHG | OXYGEN SATURATION: 100 %

## 2020-09-23 DIAGNOSIS — R93.2 ABNORMAL LIVER SCAN: ICD-10-CM

## 2020-09-23 PROCEDURE — 88342 IMHCHEM/IMCYTCHM 1ST ANTB: CPT | Mod: 26,,, | Performed by: PATHOLOGY

## 2020-09-23 PROCEDURE — 88307 TISSUE EXAM BY PATHOLOGIST: CPT | Performed by: PATHOLOGY

## 2020-09-23 PROCEDURE — 88313 SPECIAL STAINS GROUP 2: CPT | Performed by: PATHOLOGY

## 2020-09-23 PROCEDURE — 63600175 PHARM REV CODE 636 W HCPCS: Performed by: STUDENT IN AN ORGANIZED HEALTH CARE EDUCATION/TRAINING PROGRAM

## 2020-09-23 PROCEDURE — 88307 PR  SURG PATH,LEVEL V: ICD-10-PCS | Mod: 26,,, | Performed by: PATHOLOGY

## 2020-09-23 PROCEDURE — 88313 PR  SPECIAL STAINS,GROUP II: ICD-10-PCS | Mod: 26,,, | Performed by: PATHOLOGY

## 2020-09-23 PROCEDURE — 88307 TISSUE EXAM BY PATHOLOGIST: CPT | Mod: 26,,, | Performed by: PATHOLOGY

## 2020-09-23 PROCEDURE — 88342 IMHCHEM/IMCYTCHM 1ST ANTB: CPT | Performed by: PATHOLOGY

## 2020-09-23 PROCEDURE — 88342 CHG IMMUNOCYTOCHEMISTRY: ICD-10-PCS | Mod: 26,,, | Performed by: PATHOLOGY

## 2020-09-23 PROCEDURE — 88313 SPECIAL STAINS GROUP 2: CPT | Mod: 26,,, | Performed by: PATHOLOGY

## 2020-09-23 RX ORDER — FENTANYL CITRATE 50 UG/ML
INJECTION, SOLUTION INTRAMUSCULAR; INTRAVENOUS CODE/TRAUMA/SEDATION MEDICATION
Status: COMPLETED | OUTPATIENT
Start: 2020-09-23 | End: 2020-09-23

## 2020-09-23 RX ORDER — MIDAZOLAM HYDROCHLORIDE 1 MG/ML
INJECTION INTRAMUSCULAR; INTRAVENOUS CODE/TRAUMA/SEDATION MEDICATION
Status: COMPLETED | OUTPATIENT
Start: 2020-09-23 | End: 2020-09-23

## 2020-09-23 RX ORDER — SODIUM CHLORIDE 9 MG/ML
INJECTION, SOLUTION INTRAVENOUS CONTINUOUS
Status: DISCONTINUED | OUTPATIENT
Start: 2020-09-23 | End: 2020-09-24 | Stop reason: HOSPADM

## 2020-09-23 RX ADMIN — FENTANYL CITRATE 50 MCG: 50 INJECTION, SOLUTION INTRAMUSCULAR; INTRAVENOUS at 02:09

## 2020-09-23 RX ADMIN — MIDAZOLAM HYDROCHLORIDE 1 MG: 1 INJECTION, SOLUTION INTRAMUSCULAR; INTRAVENOUS at 02:09

## 2020-09-23 NOTE — DISCHARGE SUMMARY
"Radiology Discharge Summary      Hospital Course: No complications    Admit Date: 9/23/2020  Discharge Date: 9/23/2020     Instructions Given to Patient: Yes  Diet: Resume prior diet  Activity: activity as tolerated and no driving for today    Description of Condition on Discharge: Stable  Vital Signs (Most Recent): Temp: 98.6 °F (37 °C) (09/23/20 1430)  Pulse: 72 (09/23/20 1515)  Resp: 16 (09/23/20 1515)  BP: 106/68 (09/23/20 1515)  SpO2: 100 % (09/23/20 1515)    Discharge Disposition: Home    Discharge Diagnosis: liver dysfunction with indefinite liver lesion     Follow-up: as scheduled    Jagjit Mehta MD ([facility])  Interventional Radiology  [phone]        "

## 2020-09-23 NOTE — H&P
Radiology History & Physical      SUBJECTIVE:     Chief Complaint: liver lesion    History of Present Illness:  Сергей Ragsdale is a 40 y.o. male who presents for liver biopsy    Past Medical History:   Diagnosis Date    Elevated LFTs     HTN (hypertension)     Hyperlipidemia      Past Surgical History:   Procedure Laterality Date    COLONOSCOPY N/A 9/17/2020    Procedure: COLONOSCOPY;  Surgeon: Ryan Bang MD;  [facility]  Service: Endoscopy;  Laterality: N/A;    ESOPHAGOGASTRODUODENOSCOPY N/A 9/17/2020    Procedure: EGD (ESOPHAGOGASTRODUODENOSCOPY);  Surgeon: Ryan Bang MD;  Location: [facility] ([address] [place] [address]);  Service: Endoscopy;  Laterality: N/A;    TONSILLECTOMY         Home Meds:   Prior to Admission medications    Medication Sig Start Date End Date Taking? Authorizing Provider   gabapentin (NEURONTIN) 300 MG capsule Take 2 capsules (600 mg total) by mouth every evening. 9/18/20 9/18/21 Yes Jv Kwon MD   morphine (MS CONTIN) 15 MG 12 hr tablet Take 1 tablet (15 mg total) by mouth nightly. 9/18/20  Yes Jv Kwon MD   nebivoloL (BYSTOLIC) 10 MG Tab Take 1 tablet (10 mg total) by mouth once daily. 3/20/20  Yes Roney Love MD   nortriptyline (PAMELOR) 25 MG capsule Take 1 capsule (25 mg total) by mouth every evening. 9/18/20 1/16/21 Yes Jv Kwon MD     Anticoagulants/Antiplatelets: no anticoagulation    Allergies: Review of patient's allergies indicates:  No Known Allergies  Sedation History:  have not been any systemic reactions    Review of Systems:   Hematological: no known coagulopathies  Respiratory: no shortness of breath  Cardiovascular: no chest pain  Gastrointestinal: no abdominal pain  Genito-Urinary: no dysuria  Musculoskeletal: negative  Neurological: no TIA or stroke symptoms         OBJECTIVE:     Vital Signs (Most Recent)  Temp: 98 °F (36.7 °C) (09/23/20 1300)  Pulse: 77 (09/23/20 1300)  Resp: 18 (09/23/20 1300)  BP: 114/71 (09/23/20  1300)  SpO2: 100 % (09/23/20 1300)    Physical Exam:  ASA: 3  Mallampati: 3    General: no acute distress  Mental Status: alert and oriented to person, place and time  HEENT: normocephalic, atraumatic  Chest: unlabored breathing  Heart: regular heart rate  Abdomen: nondistended  Extremity: moves all extremities    Laboratory  Lab Results   Component Value Date    INR 1.2 09/23/2020       Lab Results   Component Value Date    WBC 9.37 09/23/2020    HGB 8.6 (L) 09/23/2020    HCT 27.6 (L) 09/23/2020     (H) 09/23/2020     09/23/2020      Lab Results   Component Value Date     (H) 09/23/2020     09/23/2020    K 3.8 09/23/2020     09/23/2020    CO2 25 09/23/2020    BUN 6 09/23/2020    CREATININE 0.7 09/23/2020    CALCIUM 9.4 09/23/2020    ALT 26 09/23/2020    AST 91 (H) 09/23/2020    ALBUMIN 2.6 (L) 09/23/2020    BILITOT 2.6 (H) 09/23/2020       ASSESSMENT/PLAN:     Sedation Plan: up to moderate  Patient will undergo liver biopsy.    Scooter Ortega MD  PGY-II, Radiology

## 2020-09-23 NOTE — PROCEDURES
"  Pre Op Diagnosis: Liver dysfunction  Post Op Diagnosis: Same    Procedure: Liver biopsy    Procedure performed by: Tyson    Written Informed Consent Obtained: Yes  Specimen Removed: NO  Estimated Blood Loss: Minimal    Findings:   Liver biopsy performed with six 20g cores taken of liver from hypodense focus in segment 7. Gelfoam used for hemostasis of tract    Patient tolerated procedure well.    Jagjit Mehta MD ([facility])  Interventional Radiology  [phone]        "

## 2020-09-23 NOTE — PROGRESS NOTES
Review AVS and S&S of infection / pt AAO w/ NAD and up to get dressed / placed to WC and escorted to entrance with wife waiting

## 2020-09-23 NOTE — PROGRESS NOTES
Procedure complete, pt tolerated well and without event, Rt abd dsg is CDI without bleeding or hematoma, specimen obtained and sent to pathology for ordered studies, escorted to [facility] under care of RN, bedside report provided to ROSENDO Oliva RN, pt spouse updated per telephone

## 2020-09-23 NOTE — PROGRESS NOTES
NAD and no acute change noted pt tolerating PO fluid and waiting allowed time for D/C home / Cecily wife contacted by nurse

## 2020-09-24 LAB
GENETICIST REVIEW: NORMAL
HFE GENE MUT ANL BLD/T: NORMAL
HFE RELEASED BY: NORMAL
HFE RESULT SUMMARY: NORMAL
REF LAB TEST METHOD: NORMAL
SPECIMEN SOURCE: NORMAL
SPECIMEN,  HEMOCHROMATOSIS: NORMAL

## 2020-09-25 ENCOUNTER — PATIENT MESSAGE (OUTPATIENT)
Dept: PSYCHIATRY | Facility: CLINIC | Age: 40
End: 2020-09-25

## 2020-09-25 LAB
COMMENT: NORMAL
FINAL PATHOLOGIC DIAGNOSIS: NORMAL
GROSS: NORMAL
MICROSCOPIC EXAM: NORMAL

## 2020-09-25 NOTE — DISCHARGE SUMMARY
Discharge Summary  Oncology       Name: Сергей Ragsdale  YOB: 1980 (Age: 40 y.o.)  Date of Admission: 9/16/2020  Date of Discharge: 9/25/2020  Attending Provider on Discharge: Jv Kwon MD  Hospital Medicine Team: Networked reference to record PCT   Code status: Full Code    Primary Care Provider: Roney Love MD    Discharge Diagnosis:  Active Hospital Problems    Diagnosis  POA    *Cirrhosis of liver without ascites [K74.60]  Yes    Insomnia [G47.00]  Yes    Depression [F32.9]  Yes     Chronic    Pain [R52]  Yes    Severe episode of recurrent major depressive disorder, without psychotic features [F33.2]  Unknown     Recurrent major depression, with prior suicidality  (potential historical manic episodes, not fully assessed)      Palliative care encounter [Z51.5]  Not Applicable      Resolved Hospital Problems   No resolved problems to display.       HPI: 40 y.o.male admitted for workup for a liver mass diagnosed by CT scan. He has had 3 months of worsening left foot pain. This pain has progressed to also involve his lower leg. He has been evaluated by a podiatrist and orthopedist, but after injections, steroids and gabapentin, this pain has not improved.  MRI of leg and foot was unrevealing. At the time of admission, patient was taking ibuprofen 20-30 per day for 2-3 weeks. He has also had worsening pitting edema LE, extensive ecchymosis, and hematochezia. He has also had unintentional 20 pound weight loss.     Hospital Course:  Сергей Ragsdale was admitted to Hospital Medicine for evaluation of liver mass, dizziness, weakness, rectal bleeding.  MRI liver revealed no mass, but hepatomegaly, hepatosteatosis, and sequelae of chronic liver disease (portal hypertension).  EGD revealed small esophageal varices.  Colonoscopy revealed internal hemorrhoids and AVMs as source of bleeding.  He had an MRI brain and spine to evaluate dizziness and LLE pain when it was  thought that he had advanced cancer, these did not reveal pathology to explain symptoms.    During palliative care and psychology evaluations, it was found that he has been having issues recently with major depressive disorder.  Psychiatry evaluation and follow up was provided during hospitalization.  Hepatology evaluated patient in the hospital and will order an outpatient liver biopsy and clinic follow up.    Labs:  Recent Labs   Lab 09/23/20  1007   WBC 9.37   HGB 8.6*   HCT 27.6*        Recent Labs   Lab 09/23/20  1007      K 3.8      CO2 25   BUN 6   CREATININE 0.7   *   CALCIUM 9.4     Recent Labs   Lab 09/23/20  1007   ALKPHOS 181*   ALT 26   AST 91*   ALBUMIN 2.6*   PROT 7.3   BILITOT 2.6*   INR 1.2      No results for input(s): POCTGLUCOSE in the last 168 hours.  No results for input(s): CPK, CPKMB, MB, TROPONINI in the last 72 hours.    ROS (Positive in Bold, otherwise negative)  Constitutional: fever, chills, night sweats  CV: chest pain, edema, palpitations  Resp: SOB, cough, sputum production  GI: changes in appetite, NVDC, pain, melena, hematochezia, GERD, hematemesis  : Dysuria, hematuria, urinary urgency, frequency  MSK: arthralgia/myalgia, joint swelling  Neuro/Psych: anxiety, depression    Discharge Exam: Patient was seen and examined on the date of discharge and determined to be suitable for discharge.    Procedures: CXR    Consultants: Hepatology, GI, psychiatry, psychology, palliative care    Discharge Medication List as of 9/18/2020 12:00 PM      START taking these medications    Details   morphine (MS CONTIN) 15 MG 12 hr tablet Take 1 tablet (15 mg total) by mouth nightly., Starting Fri 9/18/2020, Normal      nortriptyline (PAMELOR) 25 MG capsule Take 1 capsule (25 mg total) by mouth every evening., Starting Fri 9/18/2020, Until Sat 1/16/2021, Normal         CONTINUE these medications which have CHANGED    Details   gabapentin (NEURONTIN) 300 MG capsule Take 2  capsules (600 mg total) by mouth every evening., Starting Fri 9/18/2020, Until Sat 9/18/2021, Normal         CONTINUE these medications which have NOT CHANGED    Details   nebivoloL (BYSTOLIC) 10 MG Tab Take 1 tablet (10 mg total) by mouth once daily., Starting Fri 3/20/2020, Normal             The relevant and important risks, side effects, and benefits of their medications were reviewed with patient during hospitalization and at discharge. The patient was given the opportunity to discuss and ask questions about their medications, including target symptoms, potential risks, side effects and benefits of their medications, as well as their expected prognosis if non-medication treatment options were chosen.  The patient expresses understanding of all these options and information and voluntarily consents to treatment.    Discharge Diet:regular diet with Normal Fluid intake of 1500 - 2000 mL per day    Activity: activity as tolerated    Discharge Condition: Good    Disposition: Home or Self Care     Time spent  on the discharge of the patient including review of hospital course with the patient. reviewing discharge medications and arranging follow-up care: 20 mins    Discharge examination Patient was seen and examined on the date of discharge and determined to be suitable for discharge.    Discharge plan and follow up:  It is critical that you make your follow-up appointment(s). If you are discharged on the weekend or after business hours, or if we are unable to schedule these appointments for you for any reason, you or a family member need to call during the next business day to schedule your appointment(s).    -Follow up with your PCP, Roney Love MD within 1-2 weeks as arranged with  and treatment team prior to discharge  -Follow up with [place] [id number] home monitoring program   -Take all medications as prescribed and listed above.     - Please return to ED or call your physician if you have:         1. Fevers >  101.5 unresponsive to tylenol.       2. Abdominal pain and/or distention       3. Intractable nausea, vomiting or diarrhea       4. Inability to tolerate adequate oral intake of food       5. Neurologic changes, chest pain or shortness of breath     6. Worsening shortness of breath        Future Appointments   Date Time Provider Department Center   10/7/2020  4:30 PM Wilson Johnson MD [facility] Mark Sanford Kwon MD  Medical Oncology   [facility]  [facility]

## 2020-09-29 ENCOUNTER — PATIENT MESSAGE (OUTPATIENT)
Dept: OTHER | Facility: OTHER | Age: 40
End: 2020-09-29

## 2020-09-29 LAB — PHOSPHATIDYLETHANOL (PETH): 619 NG/ML

## 2020-09-30 ENCOUNTER — PATIENT OUTREACH (OUTPATIENT)
Dept: ADMINISTRATIVE | Facility: OTHER | Age: 40
End: 2020-09-30

## 2020-09-30 ENCOUNTER — TELEPHONE (OUTPATIENT)
Dept: HEPATOLOGY | Facility: CLINIC | Age: 40
End: 2020-09-30

## 2020-09-30 NOTE — TELEPHONE ENCOUNTER
I spoke to the pt and let him know that I'm still waiting for the scheduling department to override a slot tomorrow so I can get him scheduled at 9am for his VV    ----- Message from Davian Aldana sent at 9/30/2020  8:18 AM CDT -----  Contact: Patient  Pt called and said that he spoke to the doctor on Monday and the doctor said that he wanted to see him tomorrow.    [facility]    Pt can be reached at [phone]

## 2020-10-01 ENCOUNTER — OFFICE VISIT (OUTPATIENT)
Dept: HEPATOLOGY | Facility: CLINIC | Age: 40
End: 2020-10-01
Payer: COMMERCIAL

## 2020-10-01 DIAGNOSIS — R79.89 ELEVATED LFTS: ICD-10-CM

## 2020-10-01 PROCEDURE — 99213 PR OFFICE/OUTPT VISIT, EST, LEVL III, 20-29 MIN: ICD-10-PCS | Mod: 95,,, | Performed by: INTERNAL MEDICINE

## 2020-10-01 PROCEDURE — 99213 OFFICE O/P EST LOW 20 MIN: CPT | Mod: 95,,, | Performed by: INTERNAL MEDICINE

## 2020-10-01 NOTE — PROGRESS NOTES
Subjective:       Patient ID: Сергей Ragsdale is a 40 y.o. male.    Chief Complaint: No chief complaint on file.  The patient location is: Home  The chief complaint leading to consultation is: Jaundice and abnormal LFTs    Visit type: audiovisual    Face to Face time with patient: 20 minutes of total time spent on the encounter, which includes face to face time and non-face to face time preparing to see the patient (eg, review of tests), Obtaining and/or reviewing separately obtained history, Documenting clinical information in the electronic or other health record, Independently interpreting results (not separately reported) and communicating results to the patient/family/caregiver, or Care coordination (not separately reported).       Each patient to whom he or she provides medical services by telemedicine is:  (1) informed of the relationship between the physician and patient and the respective role of any other health care provider with respect to management of the patient; and (2) notified that he or she may decline to receive medical services by telemedicine and may withdraw from such care at any time.    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  He was recently admitted to hospital for investigation of multiple symptoms including jaundice, mild ascites/edema and an abnormal scan suggestive of a possible mass.    It was felt that he had mildly decompensated cirrhosis with features of portal hypertension on imaging. His CT and MRI abdomen were reviewed by radiology and it was not felt that he had malignancy.    His EGD showed small varices.    A percutaneous liver biopsy (targeting the possible mass) showed cirrhosis and steatohepatitis. There was no malignancy.    Liver biopsy: 9/23/2020  Negative for neoplasm, see comment   Steatohepatitis with severe steatosis, 90%   Extensive pericellular and bridging fibrosis with focal nodule formation (at   least stage 3 of  4)   No hepatocyte iron      MELD-Na score: 12 at 9/23/2020 10:07 AM  MELD score: 12 at 9/23/2020 10:07 AM  Calculated from:  Serum Creatinine: 0.7 mg/dL (Rounded to 1 mg/dL) at 9/23/2020 10:07 AM  Serum Sodium: 141 mmol/L (Rounded to 137 mmol/L) at 9/23/2020 10:07 AM  Total Bilirubin: 2.6 mg/dL at 9/23/2020 10:07 AM  INR(ratio): 1.2 at 9/23/2020 10:07 AM  Age: 40 years 2 months      Since discharge, he has been improving and feels that his fluid retention is almost gone- on no diuretics  He has abstained from alcohol since he became ill.      Review of Systems   Constitutional: Negative for activity change, appetite change, chills, fatigue, fever and unexpected weight change.   HENT: Negative for hearing loss.    Eyes: Negative for discharge and visual disturbance.   Respiratory: Negative for cough, chest tightness, shortness of breath and wheezing.    Cardiovascular: Negative for chest pain, palpitations and leg swelling.   Gastrointestinal: Negative for abdominal distention, abdominal pain, constipation, diarrhea and nausea.   Genitourinary: Negative for dysuria and frequency.   Musculoskeletal: Negative for arthralgias and back pain.   Skin: Negative for pallor and rash.   Neurological: Negative for dizziness, tremors, speech difficulty and headaches.   Hematological: Negative for adenopathy.   Psychiatric/Behavioral: Negative for agitation and confusion.           Lab Results   Component Value Date    ALT 26 09/23/2020    AST 91 (H) 09/23/2020    ALKPHOS 181 (H) 09/23/2020    BILITOT 2.6 (H) 09/23/2020     Past Medical History:   Diagnosis Date    Elevated LFTs     HTN (hypertension)     Hyperlipidemia      Past Surgical History:   Procedure Laterality Date    COLONOSCOPY N/A 9/17/2020    Procedure: COLONOSCOPY;  Surgeon: Ryan Bang MD;  Location: [address]);  Service: Endoscopy;  Laterality: N/A;    ESOPHAGOGASTRODUODENOSCOPY N/A 9/17/2020    Procedure: EGD (ESOPHAGOGASTRODUODENOSCOPY);  Surgeon:  yRan Bang MD;  Location: [facility] ([address] [place] [address]);  Service: Endoscopy;  Laterality: N/A;    TONSILLECTOMY       Current Outpatient Medications   Medication Sig    gabapentin (NEURONTIN) 300 MG capsule Take 2 capsules (600 mg total) by mouth every evening.    morphine (MS CONTIN) 15 MG 12 hr tablet Take 1 tablet (15 mg total) by mouth nightly.    nebivoloL (BYSTOLIC) 10 MG Tab Take 1 tablet (10 mg total) by mouth once daily.    nortriptyline (PAMELOR) 25 MG capsule Take 1 capsule (25 mg total) by mouth every evening.     No current facility-administered medications for this visit.        Objective:      Physical Exam      NOT DONE- VIDEO VISIT    Assessment:       1. Elevated LFTs        Plan:   I explained to Dr Ragsdale that he has cirrhosis caused by steatohepatitis. There is some evidence from his PETH test that his recent alcohol intake has been moderate to heavy and he tells me that this is because he was using it for pain relief for his foot.    - reiterated the importance of complete abstinence  - LFTs and symptoms of decompensation are improving fast  - he has never had any signs or symptoms compatible with hepatic encephalopathy   - fatigued likely due to anemia    Physically he seems able to resume work and I have cleared him for this.  -Labs on Tuesday next week  - clinic in 4 weeks

## 2020-10-02 ENCOUNTER — TELEPHONE (OUTPATIENT)
Dept: HEPATOLOGY | Facility: CLINIC | Age: 40
End: 2020-10-02

## 2020-10-02 NOTE — TELEPHONE ENCOUNTER
----- Message from Wilson Johnson MD sent at 10/2/2020  1:44 PM CDT -----  Labs next Tuesday at [facility] and clinic in 4 weeks- video is okay

## 2020-12-11 ENCOUNTER — PATIENT MESSAGE (OUTPATIENT)
Dept: OTHER | Facility: OTHER | Age: 40
End: 2020-12-11

## 2021-01-04 ENCOUNTER — PATIENT MESSAGE (OUTPATIENT)
Dept: ADMINISTRATIVE | Facility: HOSPITAL | Age: 41
End: 2021-01-04

## 2021-04-05 ENCOUNTER — PATIENT MESSAGE (OUTPATIENT)
Dept: ADMINISTRATIVE | Facility: HOSPITAL | Age: 41
End: 2021-04-05

## 2021-04-12 ENCOUNTER — PATIENT MESSAGE (OUTPATIENT)
Dept: ADMINISTRATIVE | Facility: HOSPITAL | Age: 41
End: 2021-04-12

## 2021-04-12 DIAGNOSIS — I10 HYPERTENSION, UNSPECIFIED TYPE: ICD-10-CM

## 2021-04-12 DIAGNOSIS — E78.49 OTHER HYPERLIPIDEMIA: Primary | ICD-10-CM

## 2021-07-07 ENCOUNTER — PATIENT MESSAGE (OUTPATIENT)
Dept: ADMINISTRATIVE | Facility: HOSPITAL | Age: 41
End: 2021-07-07

## 2021-07-21 ENCOUNTER — PATIENT MESSAGE (OUTPATIENT)
Dept: HEPATOLOGY | Facility: CLINIC | Age: 41
End: 2021-07-21

## 2021-07-23 ENCOUNTER — PATIENT MESSAGE (OUTPATIENT)
Dept: HEPATOLOGY | Facility: CLINIC | Age: 41
End: 2021-07-23

## 2021-10-04 ENCOUNTER — PATIENT MESSAGE (OUTPATIENT)
Dept: ADMINISTRATIVE | Facility: HOSPITAL | Age: 41
End: 2021-10-04

## 2022-01-26 ENCOUNTER — PATIENT MESSAGE (OUTPATIENT)
Dept: ADMINISTRATIVE | Facility: HOSPITAL | Age: 42
End: 2022-01-26
Payer: COMMERCIAL

## 2022-03-16 ENCOUNTER — PATIENT MESSAGE (OUTPATIENT)
Dept: ADMINISTRATIVE | Facility: HOSPITAL | Age: 42
End: 2022-03-16
Payer: COMMERCIAL

## 2023-04-05 NOTE — CONSULTS
Left patient detailed message    [facility]  Gastroenterology Service  Consult Note    Patient Name: Сергей Ragsdale  MRN: [id number]  Admission Date: 9/16/2020  Hospital Length of Stay: 0 days  Code Status: No Order   Attending Provider: Sandy Javier MD   Consulting Provider: Jeanna Rosario NP  Primary Care Physician: Roney Love MD  Principal Problem:<principal problem not specified>    Inpatient consult to Gastroenterology  Consult performed by: Jeanna Rosario NP  Consult ordered by: Jv Kwon MD        Subjective:     HPI: Сергей Ragsdale is a 40 y.o. male with history of hematochezia.  He reports that about 6 months ago he began to experience blood in his stool but he attributed it to hemorrhoids.  He does have a longstanding history of IBS since a teenager.  He reports that he has been having notably abnormal bowel movements for 2 months and during the last 3 weeks he has had daily recurrence of bloody stool 3 to 4 times a day.  He also reports that he has had several episodes of vomiting over the last month, occasionally vomiting bile and  with early satiety.  He reports an unintentional weight loss of over 20 lb.  He is taking excessive amounts of anti-inflammatories due to undiagnosed ankle/foot.  He has seen several specialist and had 2 MRIs that showed interstitial edema of left foot and ankle.  He became concerned about his symptoms yesterday after he experienced a fall at home.  Patient is a GI oncologist.              Past Medical History:   Diagnosis Date    Elevated LFTs     HTN (hypertension)     Hyperlipidemia        Past Surgical History:   Procedure Laterality Date    TONSILLECTOMY         Family History   Problem Relation Age of Onset    Hypertension Mother     Hypertension Father     No Known Problems Sister     Congenital heart disease Brother     Coronary artery disease Maternal Grandfather     Cancer Paternal Grandmother         breast    Coronary artery disease  Paternal Grandfather     No Known Problems Brother        Social History     Socioeconomic History    Marital status: Unknown     Spouse name: Not on file    Number of children: Not on file    Years of education: Not on file    Highest education level: Not on file   Occupational History    Not on file   Social Needs    Financial resource strain: Not on file    Food insecurity     Worry: Not on file     Inability: Not on file    Transportation needs     Medical: Not on file     Non-medical: Not on file   Tobacco Use    Smoking status: Never Smoker    Smokeless tobacco: Never Used   Substance and Sexual Activity    Alcohol use: Yes     Comment: socially    Drug use: Never    Sexual activity: Yes     Partners: Female   Lifestyle    Physical activity     Days per week: Not on file     Minutes per session: Not on file    Stress: Not on file   Relationships    Social connections     Talks on phone: Not on file     Gets together: Not on file     Attends Je[employer] witness service: Not on file     Active member of club or organization: Not on file     Attends meetings of clubs or organizations: Not on file     Relationship status: Not on file   Other Topics Concern    Not on file   Social History Narrative    Not on file       Current Facility-Administered Medications on File Prior to Encounter   Medication Dose Route Frequency Provider Last Rate Last Dose    [COMPLETED] iohexoL (OMNIPAQUE 350) injection 100 mL  100 mL Intravenous ONCE PRN Brianna Villar MD   100 mL at 09/15/20 1549     Current Outpatient Medications on File Prior to Encounter   Medication Sig Dispense Refill    gabapentin (NEURONTIN) 300 MG capsule Take 2 capsules (600 mg total) by mouth every evening. 60 capsule 11    nebivoloL (BYSTOLIC) 10 MG Tab Take 1 tablet (10 mg total) by mouth once daily. 90 tablet 12    [DISCONTINUED] meloxicam (MOBIC) 15 MG tablet Take 1 tablet (15 mg total) by mouth once daily. 30 tablet 0    [DISCONTINUED]  methylPREDNISolone (MEDROL, MEKHI,) 4 mg tablet Take as instructed on package 21 tablet 0       Review of patient's allergies indicates:  No Known Allergies    Review of Systems:   Constitutional: no fever, chills or +change in weight   Eyes: no visual changes   ENT: no sore throat or dysphagia  Respiratory: no cough or shortness of breath   Cardiovascular: no chest pain or palpitations   Gastrointestinal: as per HPI  Hematologic/Lymphatic: + easy bruising or lymphadenopathy   Musculoskeletal: + arthralgias or myalgias   Neurological: no change in mental status  Behavioral/Psych: no change in mood    Objective:     Vitals:    09/16/20 1248   Temp: 98.3 °F (36.8 °C)       General: Alert and Oriented, no distress  HEENT: Normocephalic, Atraumatic. + scleral icterus.  Resp: Good air entry bilaterally, no adventitious sounds  Cardiac: S1 and S2 normal  Abdomen: Normoactive bowel sounds. Non-distended. Normal tympany. Soft. Non-tender. No peritoneal signs. HUYEN: small external hemorrhoid, small internal hemorrhoid, +small amount of bright red blood visualized   Extremities: + bilateral peripheral edema.   Neurologic: No gross neurological Deficits  Psych: Calm, cooperative. Normal mood and affect.    Significant Labs:  Recent Labs   Lab 09/15/20  1248   HGB 9.7*       Lab Results   Component Value Date    WBC 7.29 09/15/2020    HGB 9.7 (L) 09/15/2020    HCT 29.3 (L) 09/15/2020     (H) 09/15/2020     (L) 09/15/2020       Lab Results   Component Value Date     09/15/2020    K 3.7 09/15/2020     09/15/2020    CO2 25 09/15/2020    BUN 6 09/15/2020    CREATININE 0.9 09/15/2020    CALCIUM 8.7 09/15/2020    ANIONGAP 11 09/15/2020    ESTGFRAFRICA >60.0 09/15/2020    EGFRNONAA >60.0 09/15/2020       Lab Results   Component Value Date    ALT 40 09/15/2020     (H) 09/15/2020    ALKPHOS 214 (H) 09/15/2020    BILITOT 2.7 (H) 09/15/2020       Lab Results   Component Value Date    INR 1.2 09/15/2020        Significant Imaging:  Reviewed pertinent radiology findings.       Assessment/Plan:     Сергей Ragsdale is a 40 y.o. male with history of hematochezia.  He reports that about 6 months ago he began to experience blood in his stool but he attributed it to hemorrhoids.  He does have a longstanding history of IBS since a teenager.  He reports that he has been having notably abnormal bowel movements for 2 months and during the last 3 weeks he has had daily recurrence of bloody stool 3 to 4 times a day.  He also reports that he has had several episodes of vomiting over the last month, occasionally vomiting bile and  with early satiety.  He reports an unintentional weight loss of over 20 lb.  He is taking excessive amounts of anti-inflammatories due to undiagnosed ankle/foot.  He has seen several specialist and had 2 MRIs that showed interstitial edema of left foot and ankle.  He became concerned about his symptoms yesterday after he experienced a fall at home.  He has no family history of any gastroenterology cancers.  Patient is a GI oncologist.      He is hemodynamically stable.  CT of the abdomen done yesterday demonstrates Hepatomegaly with diffuse heterogeneous hepatic enhancement and ill-defined hypoenhancing mass of the caudate lobe.  Stigmata of chronic portal hypertension diagnostic of chronic liver disease of any cause.  Findings could be related to nonuniform fibrofatty infiltration and [place] cirrhosis, but multifocal primary or metastatic hepatic malignancy including cholangiocarcinoma are also to be considered and should be included in the differential diagnosis.  H&H yesterday was 29.39.7 previous H&H done 9 months ago was 44.4 and 14.7.       Problem List:  1. Hematochezia  2. Excessive NSAID use  3. Liver Mass    Plan:  1. Suspected GI Bleed (non variceal)      Pre endoscopic risk stratification:  BUN  HB  SBP   Pulse ?100 (per min)  1 (--)  Melena    1 (--)  Syncope   2 (+)  Hepatic  disease  2 (+)  CAD    2 (--)      Suspected causes:   Upper GI: Peptic Ulcer Disease (H. Pylori vs. NSAID), Gastritis, Variceal Bleed, MW Tear, AVM, less likely malignancy  SB: AVM, AE fistula, Ulcer   Lower GI: Diverticulosis, AVM, malignancy, hemorrhoids    Management:  -Place patient clears  -Place 2 large bore IVs, volume resuscitation per primary  -Transfuse pRBC for Hb < 7 g/dL (Consider a higher Hb target if there is clinical evidence of intravascular volume depletion or comorbidities, such as CAD or if high suspicion of vigorous active ongoing bleeding or an uncorrected coagulopathy exists.).  -Correct coagulopathy (goal plt >50, INR <1.5) if present in patients without absolute contraindications.  -PPI 80 mg IV bolus once, then 8 mg/hour infusion or IV PPI 40 BID  -Avoid nonsteroidal agents, antiplatelet agents and anticoagulants if possible in patients without absolute contraindications. (consult managing provider if required for cardiovascular protection).  -Will plan for Endoscopy within 12 to 24 hours 9/17/20  -Please call with any additional questions, concerns or changes in the patient's clinical status.    High-quality evidence per [employer]                   Thank you for involving us in the care of Сергей Ragsdale. Please call with any additional questions, concerns or changes in the patient's clinical status.    Jeanna Rosario NP  Gastroenterology   [facility]